# Patient Record
Sex: FEMALE | Race: BLACK OR AFRICAN AMERICAN | NOT HISPANIC OR LATINO | Employment: OTHER | URBAN - METROPOLITAN AREA
[De-identification: names, ages, dates, MRNs, and addresses within clinical notes are randomized per-mention and may not be internally consistent; named-entity substitution may affect disease eponyms.]

---

## 2017-01-05 NOTE — TELEPHONE ENCOUNTER
----- Message from Carol Welch sent at 1/5/2017  1:10 PM CST -----  Contact: pt   Pt needs a refill on her blood pressure medication, pt could pronounce or [place], [facility] [address], please call pt when done

## 2017-01-16 RX ORDER — DILTIAZEM HYDROCHLORIDE 240 MG/1
240 CAPSULE, COATED, EXTENDED RELEASE ORAL DAILY
Qty: 90 CAPSULE | Refills: 1 | Status: SHIPPED | OUTPATIENT
Start: 2017-01-16 | End: 2017-06-07 | Stop reason: ALTCHOICE

## 2017-03-06 ENCOUNTER — PATIENT OUTREACH (OUTPATIENT)
Dept: ADMINISTRATIVE | Facility: HOSPITAL | Age: 62
End: 2017-03-06

## 2017-03-06 ENCOUNTER — TELEPHONE (OUTPATIENT)
Dept: FAMILY MEDICINE | Facility: CLINIC | Age: 62
End: 2017-03-06

## 2017-03-06 DIAGNOSIS — M19.90 ARTHRITIS: Primary | ICD-10-CM

## 2017-03-06 NOTE — TELEPHONE ENCOUNTER
Pt is wanting to get a new order for a rollator walker (walker with seat) please print order to send to [facility]

## 2017-03-06 NOTE — TELEPHONE ENCOUNTER
----- Message from Chalo Lam sent at 3/6/2017  1:44 PM CST -----  Contact: [phone]  Pt would like an order for a new Walker, or need to know where can she go to get repairs on her walker.  Please call back @ [phone].  Thanks-[facility]

## 2017-03-07 NOTE — TELEPHONE ENCOUNTER
----- Message from Ana Gilman sent at 3/7/2017 10:32 AM CST -----  Contact: Pt  Pt is requesting to speak with the nurse./ States her walker is broken and she needs to know what company you all use so that she can get it fixed./ Please advise [phone] (home)

## 2017-03-21 ENCOUNTER — HOSPITAL ENCOUNTER (EMERGENCY)
Facility: HOSPITAL | Age: 62
Discharge: HOME | End: 2017-03-21
Attending: EMERGENCY MEDICINE
Payer: COMMERCIAL

## 2017-03-21 VITALS
WEIGHT: 255 LBS | HEIGHT: 65 IN | BODY MASS INDEX: 42.49 KG/M2 | HEART RATE: 70 BPM | DIASTOLIC BLOOD PRESSURE: 87 MMHG | SYSTOLIC BLOOD PRESSURE: 171 MMHG | TEMPERATURE: 98 F | OXYGEN SATURATION: 100 % | RESPIRATION RATE: 18 BRPM

## 2017-03-21 DIAGNOSIS — I50.33 ACUTE ON CHRONIC DIASTOLIC CONGESTIVE HEART FAILURE: Primary | ICD-10-CM

## 2017-03-21 DIAGNOSIS — I10 ESSENTIAL HYPERTENSION: ICD-10-CM

## 2017-03-21 DIAGNOSIS — Z72.0 TOBACCO ABUSE: ICD-10-CM

## 2017-03-21 DIAGNOSIS — N18.30 CKD (CHRONIC KIDNEY DISEASE) STAGE 3, GFR 30-59 ML/MIN: ICD-10-CM

## 2017-03-21 DIAGNOSIS — E66.01 MORBID OBESITY WITH BMI OF 40.0-44.9, ADULT: ICD-10-CM

## 2017-03-21 LAB
ALBUMIN SERPL BCP-MCNC: 3.1 G/DL
ALP SERPL-CCNC: 122 U/L
ALT SERPL W/O P-5'-P-CCNC: 17 U/L
ANION GAP SERPL CALC-SCNC: 10 MMOL/L
APTT BLDCRRT: 30.1 SEC
AST SERPL-CCNC: 17 U/L
BASOPHILS # BLD AUTO: 0.01 K/UL
BASOPHILS NFR BLD: 0.2 %
BILIRUB SERPL-MCNC: 0.5 MG/DL
BNP SERPL-MCNC: 527 PG/ML
BUN SERPL-MCNC: 30 MG/DL
CALCIUM SERPL-MCNC: 10.7 MG/DL
CHLORIDE SERPL-SCNC: 111 MMOL/L
CO2 SERPL-SCNC: 22 MMOL/L
CREAT SERPL-MCNC: 2.2 MG/DL
DIFFERENTIAL METHOD: ABNORMAL
EOSINOPHIL # BLD AUTO: 0.1 K/UL
EOSINOPHIL NFR BLD: 1 %
ERYTHROCYTE [DISTWIDTH] IN BLOOD BY AUTOMATED COUNT: 17.6 %
EST. GFR  (AFRICAN AMERICAN): 27 ML/MIN/1.73 M^2
EST. GFR  (NON AFRICAN AMERICAN): 23 ML/MIN/1.73 M^2
GLUCOSE SERPL-MCNC: 96 MG/DL
HCT VFR BLD AUTO: 30.2 %
HGB BLD-MCNC: 9.6 G/DL
INR PPP: 1
LYMPHOCYTES # BLD AUTO: 0.8 K/UL
LYMPHOCYTES NFR BLD: 15 %
MCH RBC QN AUTO: 27.3 PG
MCHC RBC AUTO-ENTMCNC: 31.8 %
MCV RBC AUTO: 86 FL
MONOCYTES # BLD AUTO: 0.5 K/UL
MONOCYTES NFR BLD: 8.6 %
NEUTROPHILS # BLD AUTO: 4 K/UL
NEUTROPHILS NFR BLD: 75.2 %
PLATELET # BLD AUTO: 219 K/UL
PMV BLD AUTO: 9 FL
POTASSIUM SERPL-SCNC: 3.6 MMOL/L
PROT SERPL-MCNC: 7.4 G/DL
PROTHROMBIN TIME: 10.8 SEC
RBC # BLD AUTO: 3.52 M/UL
SODIUM SERPL-SCNC: 143 MMOL/L
TROPONIN I SERPL DL<=0.01 NG/ML-MCNC: 0.2 NG/ML
WBC # BLD AUTO: 5.25 K/UL

## 2017-03-21 PROCEDURE — 83880 ASSAY OF NATRIURETIC PEPTIDE: CPT

## 2017-03-21 PROCEDURE — 96375 TX/PRO/DX INJ NEW DRUG ADDON: CPT

## 2017-03-21 PROCEDURE — 99284 EMERGENCY DEPT VISIT MOD MDM: CPT | Mod: 25

## 2017-03-21 PROCEDURE — 85610 PROTHROMBIN TIME: CPT

## 2017-03-21 PROCEDURE — 93005 ELECTROCARDIOGRAM TRACING: CPT

## 2017-03-21 PROCEDURE — 80053 COMPREHEN METABOLIC PANEL: CPT

## 2017-03-21 PROCEDURE — 84484 ASSAY OF TROPONIN QUANT: CPT

## 2017-03-21 PROCEDURE — 96374 THER/PROPH/DIAG INJ IV PUSH: CPT

## 2017-03-21 PROCEDURE — 25000003 PHARM REV CODE 250: Performed by: EMERGENCY MEDICINE

## 2017-03-21 PROCEDURE — 85025 COMPLETE CBC W/AUTO DIFF WBC: CPT

## 2017-03-21 PROCEDURE — 85730 THROMBOPLASTIN TIME PARTIAL: CPT

## 2017-03-21 PROCEDURE — 93010 ELECTROCARDIOGRAM REPORT: CPT | Mod: ,,, | Performed by: INTERNAL MEDICINE

## 2017-03-21 PROCEDURE — 63600175 PHARM REV CODE 636 W HCPCS: Performed by: EMERGENCY MEDICINE

## 2017-03-21 RX ORDER — FUROSEMIDE 10 MG/ML
60 INJECTION INTRAMUSCULAR; INTRAVENOUS
Status: COMPLETED | OUTPATIENT
Start: 2017-03-21 | End: 2017-03-21

## 2017-03-21 RX ORDER — ASPIRIN 325 MG
325 TABLET ORAL
Status: COMPLETED | OUTPATIENT
Start: 2017-03-21 | End: 2017-03-21

## 2017-03-21 RX ORDER — HYDRALAZINE HYDROCHLORIDE 20 MG/ML
10 INJECTION INTRAMUSCULAR; INTRAVENOUS
Status: COMPLETED | OUTPATIENT
Start: 2017-03-21 | End: 2017-03-21

## 2017-03-21 RX ADMIN — FUROSEMIDE 60 MG: 10 INJECTION, SOLUTION INTRAMUSCULAR; INTRAVENOUS at 11:03

## 2017-03-21 RX ADMIN — HYDRALAZINE HYDROCHLORIDE 10 MG: 20 INJECTION INTRAMUSCULAR; INTRAVENOUS at 11:03

## 2017-03-21 RX ADMIN — ASPIRIN 325 MG ORAL TABLET 325 MG: 325 PILL ORAL at 11:03

## 2017-03-21 NOTE — ED PROVIDER NOTES
SCRIBE #1 NOTE: I, Mark Emanuel, am scribing for, and in the presence of, Roberta Alvarado MD. I have scribed the entire note.      History      Chief Complaint   Patient presents with    Shortness of Breath     worsening SOB for few days, hx of CHF       Review of patient's allergies indicates:  No Known Allergies     HPI   HPI    3/21/2017, 10:54 AM   History obtained from the patient and daughter      History of Present Illness: Kelsey Booker is a 61 y.o. female patient with a PMHx of CHF, HTN, who presents to the Emergency Department for SOB which onset gradually 2 days ago. Sxs are constant and moderate in severity. Sxs exacerbated when laying flat. There are no other mitigating or exacerbating factors noted. Associated sxs include dry cough.  Pt denies any fever, N/V/D, CP, chest tightness or pressure, calf pain, numbness, HA, LOC, and all other sxs at this time. Prior tx includes LASIX BID as Rx. No further complaints or concerns at this time.       Arrival mode: Personal vehicle      PCP: Silvana Stover MD       Past Medical History:  Past Medical History:   Diagnosis Date    Anemia     CHF (congestive heart failure)     Follows with Dr. Levar Reyes, cardiologist    Chronic back pain     Dr. Jonathon Tristan, pain management specialist, at [facility].    CKD (chronic kidney disease)     Follows with Dr. Mccullough, nephrologist    GERD (gastroesophageal reflux disease)     High cholesterol     History of abnormal cervical Pap smear     HTN (hypertension)     Postmenopausal     Tobacco use        Past Surgical History:  Past Surgical History:   Procedure Laterality Date    BACK SURGERY      DILATION AND CURETTAGE OF UTERUS      partial hysterectomy      Due to Fibroids         Family History:  Family History   Problem Relation Age of Onset    Thrombosis Mother     Heart disease Father     Heart disease Sister     Hypertension Brother     Heart disease Brother      Diabetes Cousin     Stroke Neg Hx     Cancer Neg Hx        Social History:  Social History     Social History Main Topics    Smoking status: Current Every Day Smoker     Packs/day: 0.50     Types: Cigarettes     Start date: 1/1/1985    Smokeless tobacco: Never Used      Comment: Interested in smoking cessation program.    Alcohol use No    Drug use: No    Sexual activity: No       ROS   Review of Systems   Constitutional: Negative for fever.   HENT: Negative for sore throat.    Respiratory: Positive for cough and shortness of breath.    Cardiovascular: Negative for chest pain, palpitations and leg swelling.   Gastrointestinal: Negative for constipation, diarrhea, nausea and vomiting.   Genitourinary: Negative for dysuria.   Musculoskeletal: Negative for back pain.   Skin: Negative for rash.   Neurological: Negative for dizziness, syncope, weakness, numbness and headaches.   Hematological: Does not bruise/bleed easily.       Physical Exam    Initial Vitals   BP Pulse Resp Temp SpO2   03/21/17 1029 03/21/17 1029 03/21/17 1029 03/21/17 1029 03/21/17 1029   182/105 74 16 98 °F (36.7 °C) 92 %      Physical Exam  Nursing Notes and Vital Signs Reviewed.  Constitutional: Patient is in no acute distress. Awake and alert. Well-developed and well-nourished. Morbidly obese.  Head: Atraumatic. Normocephalic.  Eyes: PERRL. EOM intact. Conjunctivae are not pale. No scleral icterus.  ENT: Mucous membranes are moist. Oropharynx is clear and symmetric.    Neck: Supple. Full ROM. No lymphadenopathy.  Cardiovascular: Regular rate. Regular rhythm. No murmurs, rubs, or gallops. Distal pulses are 2+ and symmetric.  Pulmonary/Chest: No respiratory distress. Clear to auscultation bilaterally. No wheezing, rales, or rhonchi.  Abdominal: Soft and non-distended.  There is no tenderness.  No rebound, guarding, or rigidity. Good bowel sounds.  Genitourinary: No CVA tenderness  Musculoskeletal: Moves all extremities. No obvious  "deformities. No edema. No calf tenderness.  Skin: Warm and dry.  Neurological:  Alert, awake, and appropriate.  Normal speech.  No acute focal neurological deficits are appreciated.  Psychiatric: Normal affect. Good eye contact. Appropriate in content.    ED Course    Procedures  ED Vital Signs:  Vitals:    03/21/17 1029 03/21/17 1054 03/21/17 1130 03/21/17 1217   BP: (!) 182/105 (!) 174/99 (!) 177/97 (!) 175/95   Pulse: 74 69  68   Resp: 16 17  16   Temp: 98 °F (36.7 °C)      TempSrc: Oral      SpO2: (!) 92% 98% 96% 100%   Weight: 115.7 kg (255 lb)      Height: 5' 5" (1.651 m)       03/21/17 1314   BP: (!) 171/87   Pulse: 70   Resp: 18   Temp: 98 °F (36.7 °C)   TempSrc: Oral   SpO2: 100%   Weight:    Height:        Abnormal Lab Results:  Labs Reviewed   CBC W/ AUTO DIFFERENTIAL - Abnormal; Notable for the following:        Result Value    RBC 3.52 (*)     Hemoglobin 9.6 (*)     Hematocrit 30.2 (*)     MCHC 31.8 (*)     RDW 17.6 (*)     MPV 9.0 (*)     Lymph # 0.8 (*)     Gran% 75.2 (*)     Lymph% 15.0 (*)     All other components within normal limits   COMPREHENSIVE METABOLIC PANEL - Abnormal; Notable for the following:     Chloride 111 (*)     CO2 22 (*)     BUN, Bld 30 (*)     Creatinine 2.2 (*)     Calcium 10.7 (*)     Albumin 3.1 (*)     eGFR if  27 (*)     eGFR if non  23 (*)     All other components within normal limits   B-TYPE NATRIURETIC PEPTIDE - Abnormal; Notable for the following:      (*)     All other components within normal limits   TROPONIN I - Abnormal; Notable for the following:     Troponin I 0.205 (*)     All other components within normal limits   PROTIME-INR   APTT        All Lab Results:  Results for orders placed or performed during the hospital encounter of 03/21/17   CBC auto differential   Result Value Ref Range    WBC 5.25 3.90 - 12.70 K/uL    RBC 3.52 (L) 4.00 - 5.40 M/uL    Hemoglobin 9.6 (L) 12.0 - 16.0 g/dL    Hematocrit 30.2 (L) 37.0 - 48.5 % "    MCV 86 82 - 98 fL    MCH 27.3 27.0 - 31.0 pg    MCHC 31.8 (L) 32.0 - 36.0 %    RDW 17.6 (H) 11.5 - 14.5 %    Platelets 219 150 - 350 K/uL    MPV 9.0 (L) 9.2 - 12.9 fL    Gran # 4.0 1.8 - 7.7 K/uL    Lymph # 0.8 (L) 1.0 - 4.8 K/uL    Mono # 0.5 0.3 - 1.0 K/uL    Eos # 0.1 0.0 - 0.5 K/uL    Baso # 0.01 0.00 - 0.20 K/uL    Gran% 75.2 (H) 38.0 - 73.0 %    Lymph% 15.0 (L) 18.0 - 48.0 %    Mono% 8.6 4.0 - 15.0 %    Eosinophil% 1.0 0.0 - 8.0 %    Basophil% 0.2 0.0 - 1.9 %    Differential Method Automated    Comprehensive metabolic panel   Result Value Ref Range    Sodium 143 136 - 145 mmol/L    Potassium 3.6 3.5 - 5.1 mmol/L    Chloride 111 (H) 95 - 110 mmol/L    CO2 22 (L) 23 - 29 mmol/L    Glucose 96 70 - 110 mg/dL    BUN, Bld 30 (H) 8 - 23 mg/dL    Creatinine 2.2 (H) 0.5 - 1.4 mg/dL    Calcium 10.7 (H) 8.7 - 10.5 mg/dL    Total Protein 7.4 6.0 - 8.4 g/dL    Albumin 3.1 (L) 3.5 - 5.2 g/dL    Total Bilirubin 0.5 0.1 - 1.0 mg/dL    Alkaline Phosphatase 122 55 - 135 U/L    AST 17 10 - 40 U/L    ALT 17 10 - 44 U/L    Anion Gap 10 8 - 16 mmol/L    eGFR if African American 27 (A) >60 mL/min/1.73 m^2    eGFR if non African American 23 (A) >60 mL/min/1.73 m^2   Protime-INR   Result Value Ref Range    Prothrombin Time 10.8 9.0 - 12.5 sec    INR 1.0 0.8 - 1.2   B-Type natriuretic peptide (BNP)   Result Value Ref Range     (H) 0 - 99 pg/mL   APTT   Result Value Ref Range    aPTT 30.1 21.0 - 32.0 sec   Troponin I   Result Value Ref Range    Troponin I 0.205 (H) 0.000 - 0.026 ng/mL         Imaging Results:  Imaging Results         X-Ray Chest AP Portable (Final result) Result time:  03/21/17 11:11:47    Final result by ROSIE Howell Sr., MD (03/21/17 11:11:47)    Impression:        1.  The lungs are clear.  2.  There is mild cardiomegaly.       Electronically signed by: ROSIE HOWELL MD  Date:     03/21/17  Time:    11:11     Narrative:    One-view chest x-ray    Clinical History: Chest pain    Finding: Comparison was made  to prior examination performed on 12/10/2016.  There is mild cardiomegaly.  The lungs are clear. There is no pneumothorax.  The costophrenic angles are sharp.                The EKG was ordered, reviewed, and independently interpreted by the ED provider.  Interpretation time: 10:43  Rate: 67 BPM  Rhythm: sinus arrhythmia  Interpretation: Criteria for LVH. T wave abnormality. No STEMI.  When compared to EKG performed 12/10/16, there are no significant changes.    The Emergency Provider reviewed the vital signs and test results, which are outlined above.    ED Discussion     1:04 PM: Reassessed pt. Pt states their condition has improved at this time, she has diuresed, VSS, has chronic poorly controlled Hypertension, troponin is chronically elevated and at baseline has CKD.  Discussed with pt all pertinent ED information and results. Discussed plan of treatment with pt. Pt instructed to f/u with cardiologist Dr. García in 1-2 days, advised to continue current lasix, low salt diet. Pt reports her BP is typically 170/90s.  Gave pt all f/u and return to the ED instructions. All questions and concerns were addressed at this time. Pt understands and agrees to plan as discussed. Pt is stable for discharge.     Pre-hypertension/Hypertension: The pt has been informed that they may have pre-hypertension or hypertension based on a blood pressure reading in the ED. I recommend that the pt call the PCP listed on their discharge instructions or a physician of their choice this week to arrange f/u for further evaluation of possible pre-hypertension or hypertension.     ED Medication(s):  Medications   aspirin tablet 325 mg (325 mg Oral Given 3/21/17 1101)   furosemide injection 60 mg (60 mg Intravenous Given 3/21/17 1102)   hydrALAZINE injection 10 mg (10 mg Intravenous Given 3/21/17 1101)       Discharge Medication List as of 3/21/2017  1:06 PM          Follow-up Information     Follow up with Dr. García your Cardiologist. Schedule  an appointment as soon as possible for a visit in 1 day.    Why:  Return to the emergency room, If symptoms worsen            Medical Decision Making    Medical Decision Making:   Clinical Tests:   Lab Tests: Reviewed and Ordered  Radiological Study: Reviewed and Ordered  Medical Tests: Reviewed and Ordered           Scribe Attestation:   Scribe #1: I performed the above scribed service and the documentation accurately describes the services I performed. I attest to the accuracy of the note.    Attending:   Physician Attestation Statement for Scribe #1: I, Roberta Alvarado MD, personally performed the services described in this documentation, as scribed by Mark Emanuel, in my presence, and it is both accurate and complete.          Clinical Impression       ICD-10-CM ICD-9-CM   1. Acute on chronic diastolic congestive heart failure I50.33 428.33     428.0   2. Essential hypertension I10 401.9   3. Morbid obesity with BMI of 40.0-44.9, adult E66.01 278.01    Z68.41 V85.41   4. Tobacco abuse Z72.0 305.1   5. CKD (chronic kidney disease) stage 3, GFR 30-59 ml/min N18.3 585.3       Disposition:   Disposition: Discharged  Condition: Stable         Roberta Alvarado MD  03/22/17 0848       Roberta Alvarado MD  03/22/17 0848       Roberta Alvarado MD  03/22/17 0849

## 2017-03-21 NOTE — ED AVS SNAPSHOT
[facility] -   [address]  [place] [address]               Kelsey Booker   3/21/2017 10:30 AM   ED    Description:  Female : 1955   Department:  [facility]           Your Care was Coordinated By:     Provider Role From To    Roberta Alvarado MD Attending Provider 17 1033 --      Reason for Visit     Shortness of Breath           Diagnoses this Visit        Comments    Acute on chronic diastolic congestive heart failure    -  Primary     Essential hypertension         Morbid obesity with BMI of 40.0-44.9, adult         Tobacco abuse           ED Disposition     None           To Do List           Follow-up Information     Follow up with Dr. García your Cardiologist. Schedule an appointment as soon as possible for a visit in 1 day.    Why:  Return to the emergency room, If symptoms worsen      [facility]     [facility] -  Assistance  Registered nurses in the [facility] provide clinical advisement, health education, appointment booking, and other advisory services.  Call for this free service at [phone].             Medications           Message regarding Medications     Verify the changes and/or additions to your medication regime listed below are the same as discussed with your clinician today.  If any of these changes or additions are incorrect, please notify your healthcare provider.        These medications were administered today        Dose Freq    aspirin tablet 325 mg 325 mg ED 1 Time    Sig: Take 1 tablet (325 mg total) by mouth ED 1 Time.    Class: Normal    Route: Oral    furosemide injection 60 mg 60 mg ED 1 Time    Sig: Inject 6 mLs (60 mg total) into the vein ED 1 Time.    Class: Normal    Route: Intravenous    hydrALAZINE injection 10 mg 10 mg ED 1 Time    Sig: Inject 0.5 mLs (10 mg total) into the vein ED 1 Time.    Class: Normal    Route: Intravenous      STOP  taking these medications     potassium chloride SA (K-DUR,KLOR-CON) 20 MEQ tablet Take 1 tablet (20 mEq total) by mouth 2 (two) times daily.    nicotine (NICODERM CQ) 21 mg/24 hr Place 1 patch onto the skin once daily.           Verify that the below list of medications is an accurate representation of the medications you are currently taking.  If none reported, the list may be blank. If incorrect, please contact your healthcare provider. Carry this list with you in case of emergency.           Current Medications     diltiaZEM (CARDIZEM CD) 240 MG 24 hr capsule Take 1 capsule (240 mg total) by mouth once daily.    esomeprazole (NEXIUM) 40 MG capsule Take 1 capsule by mouth once daily.    furosemide (LASIX) 40 MG tablet Take 1 tablet (40 mg total) by mouth 2 (two) times daily.    gabapentin (NEURONTIN) 300 MG capsule Take 300 mg by mouth 2 (two) times daily. 2 tablets in the AM and 3 tablets at night    hydrALAZINE (APRESOLINE) 50 MG tablet Take 1 tablet (50 mg total) by mouth 3 (three) times daily.    methocarbamol (ROBAXIN) 750 MG Tab Take 1 tablet by mouth 3 (three) times daily as needed.    metoprolol tartrate (LOPRESSOR) 50 MG tablet Take 1 tablet (50 mg total) by mouth 2 (two) times daily.    oxycodone 20 mg Tab 20 mg every 6 (six) hours as needed.     sertraline (ZOLOFT) 50 MG tablet Take 1 tablet (50 mg total) by mouth once daily.    terazosin (HYTRIN) 2 MG capsule take 2 capsule by mouth at bedtime    benzonatate (TESSALON) 100 MG capsule take 1 capsule by mouth twice a day if needed for cough    CRESTOR 20 mg tablet Take 1 tablet (20 mg total) by mouth every evening.    ferrous sulfate 325 mg (65 mg iron) Tab tablet Take 1 tablet by mouth 2 (two) times daily.    hydrALAZINE injection 10 mg Inject 0.5 mLs (10 mg total) into the vein ED 1 Time.    hyoscyamine (LEVSIN/SL) 0.125 mg Subl Place 1 tablet (0.125 mg total) under the tongue every 4 (four) hours as needed.    promethazine (PHENERGAN) 12.5 MG Tab Take  "12.5 mg by mouth every 8 (eight) hours.           Clinical Reference Information           Your Vitals Were     BP Pulse Temp Resp Height Weight    177/97 69 98 °F (36.7 °C) (Oral) 17 5' 5" (1.651 m) 115.7 kg (255 lb)    SpO2 BMI             96% 42.43 kg/m2         Allergies as of 3/21/2017     No Known Allergies      Immunizations Administered on Date of Encounter - 3/21/2017     None      ED Micro, Lab, POCT     Start Ordered       Status Ordering Provider    03/21/17 1241 03/21/17 1240    Add-on,   Status:  Canceled      Canceled     03/21/17 1036 03/21/17 1035  APTT  STAT      Final result     03/21/17 1035 03/21/17 1035  CBC auto differential  STAT      Final result     03/21/17 1035 03/21/17 1035  Comprehensive metabolic panel  STAT      Final result     03/21/17 1035 03/21/17 1035  Protime-INR  STAT      Final result     03/21/17 1035 03/21/17 1035  B-Type natriuretic peptide (BNP)  STAT      Final result       ED Imaging Orders     Start Ordered       Status Ordering Provider    03/21/17 1036 03/21/17 1035  X-Ray Chest AP Portable  1 time imaging      Final result       Discharge References/Attachments     HEART FAILURE, CONGESTIVE (ENGLISH)    HEART FAILURE, DISCHARGE INSTRUCTIONS FOR (ENGLISH)    HIGH BLOOD PRESSURE (HYPERTENSION), DISCHARGE INSTRUCTIONS (ENGLISH)    HIGH BLOOD PRESSURE, CONTROLLING (ENGLISH)      [facility] Sign-Up     Activating your [facility] account is as easy as 1-2-3!     1) Visit [email or web address], select Sign Up Now, enter this activation code and your date of birth, then select Next.  [email or web address]  Expires: 5/5/2017  1:06 PM      2) Create a username and password to use when you visit [facility] in the future and select a security question in case you lose your password and select Next.    3) Enter your e-mail address and click Sign Up!    Additional Information  If you have questions, please e-mail [email or web address] or call [phone] to talk to our [facility] staff. " Remember, [facility] is NOT to be used for urgent needs. For medical emergencies, dial 911.         Smoking Cessation     If you would like to quit smoking:   You may be eligible for free services if you are a [place] resident and started smoking cigarettes before September 1, 1988.  Call the [employer]oking [employer]essation Trust ([employer]) toll free at (648) 533-0003 or (879) 729-3362.   Call 1-800-QUIT-NOW if you do not meet the above criteria.             [facility] complies with applicable Federal civil rights laws and does not discriminate on the basis of race, color, national origin, age, disability, or sex.        Language Assistance Services     ATTENTION: Language assistance services are available, free of charge. Please call [phone].      ATENCIÓN: Si christosla acrinehalley, tiene a abarca disposición servicios gratuitos de asistencia lingüística. Llame al [phone].     CHÚ Ý: N?u b?n nói Ti?ng Vi?t, có các d?ch v? h? tr? ngôn ng? mi?n phí dành cho b?n. G?i s? [phone].

## 2017-04-11 ENCOUNTER — TELEPHONE (OUTPATIENT)
Dept: SMOKING CESSATION | Facility: CLINIC | Age: 62
End: 2017-04-11

## 2017-04-18 ENCOUNTER — TELEPHONE (OUTPATIENT)
Dept: SMOKING CESSATION | Facility: CLINIC | Age: 62
End: 2017-04-18

## 2017-04-19 ENCOUNTER — TELEPHONE (OUTPATIENT)
Dept: SMOKING CESSATION | Facility: CLINIC | Age: 62
End: 2017-04-19

## 2017-04-19 NOTE — TELEPHONE ENCOUNTER
Third attempt left message regarding smoking cessation quit 1 episode, will call back at a later time.

## 2017-05-17 ENCOUNTER — CLINICAL SUPPORT (OUTPATIENT)
Dept: SMOKING CESSATION | Facility: CLINIC | Age: 62
End: 2017-05-17
Payer: COMMERCIAL

## 2017-05-17 DIAGNOSIS — F17.200 NICOTINE DEPENDENCE: Primary | ICD-10-CM

## 2017-05-17 PROCEDURE — 99407 BEHAV CHNG SMOKING > 10 MIN: CPT | Mod: S$GLB,,,

## 2017-06-07 ENCOUNTER — OFFICE VISIT (OUTPATIENT)
Dept: FAMILY MEDICINE | Facility: CLINIC | Age: 62
End: 2017-06-07
Payer: COMMERCIAL

## 2017-06-07 VITALS
BODY MASS INDEX: 42.39 KG/M2 | OXYGEN SATURATION: 90 % | TEMPERATURE: 97 F | DIASTOLIC BLOOD PRESSURE: 98 MMHG | HEART RATE: 86 BPM | WEIGHT: 254.44 LBS | HEIGHT: 65 IN | SYSTOLIC BLOOD PRESSURE: 146 MMHG | RESPIRATION RATE: 18 BRPM

## 2017-06-07 DIAGNOSIS — Z76.0 MEDICATION REFILL: ICD-10-CM

## 2017-06-07 DIAGNOSIS — R51.9 HEADACHE, UNSPECIFIED HEADACHE TYPE: ICD-10-CM

## 2017-06-07 DIAGNOSIS — I10 ESSENTIAL HYPERTENSION: Primary | ICD-10-CM

## 2017-06-07 PROCEDURE — 99214 OFFICE O/P EST MOD 30 MIN: CPT | Mod: S$GLB,,, | Performed by: FAMILY MEDICINE

## 2017-06-07 PROCEDURE — 99999 PR PBB SHADOW E&M-EST. PATIENT-LVL IV: CPT | Mod: PBBFAC,,, | Performed by: FAMILY MEDICINE

## 2017-06-07 RX ORDER — BUTALBITAL, ACETAMINOPHEN AND CAFFEINE 50; 325; 40 MG/1; MG/1; MG/1
1 TABLET ORAL DAILY PRN
Qty: 10 TABLET | Refills: 0 | Status: SHIPPED | OUTPATIENT
Start: 2017-06-07 | End: 2017-07-27 | Stop reason: SDUPTHER

## 2017-06-07 RX ORDER — METOPROLOL TARTRATE 50 MG/1
50 TABLET ORAL 2 TIMES DAILY
Qty: 60 TABLET | Refills: 1 | Status: SHIPPED | OUTPATIENT
Start: 2017-06-07

## 2017-06-07 RX ORDER — HYOSCYAMINE SULFATE 0.12 MG/1
0.12 TABLET SUBLINGUAL EVERY 4 HOURS PRN
Qty: 30 TABLET | Refills: 0 | Status: SHIPPED | OUTPATIENT
Start: 2017-06-07

## 2017-06-07 RX ORDER — LOSARTAN POTASSIUM 25 MG/1
TABLET ORAL
Refills: 0 | COMMUNITY
Start: 2017-04-19 | End: 2017-06-07 | Stop reason: SDUPTHER

## 2017-06-07 RX ORDER — DILTIAZEM HYDROCHLORIDE 240 MG/1
CAPSULE, COATED, EXTENDED RELEASE ORAL
Status: CANCELLED | OUTPATIENT
Start: 2017-06-07

## 2017-06-07 RX ORDER — HYDRALAZINE HYDROCHLORIDE 100 MG/1
TABLET, FILM COATED ORAL
Refills: 1 | COMMUNITY
Start: 2017-04-19 | End: 2017-06-07 | Stop reason: SDUPTHER

## 2017-06-07 RX ORDER — TERAZOSIN 2 MG/1
CAPSULE ORAL
Qty: 60 CAPSULE | Refills: 1 | Status: SHIPPED | OUTPATIENT
Start: 2017-06-07

## 2017-06-07 RX ORDER — DILTIAZEM HYDROCHLORIDE 360 MG/1
360 CAPSULE, EXTENDED RELEASE ORAL DAILY
Qty: 30 CAPSULE | Refills: 1 | Status: SHIPPED | OUTPATIENT
Start: 2017-06-07 | End: 2017-08-24 | Stop reason: SDUPTHER

## 2017-06-07 RX ORDER — HYDRALAZINE HYDROCHLORIDE 100 MG/1
100 TABLET, FILM COATED ORAL 3 TIMES DAILY
Qty: 90 TABLET | Refills: 1 | Status: SHIPPED | OUTPATIENT
Start: 2017-06-07

## 2017-06-07 RX ORDER — LOSARTAN POTASSIUM 25 MG/1
25 TABLET ORAL DAILY
Qty: 30 TABLET | Refills: 1 | Status: SHIPPED | OUTPATIENT
Start: 2017-06-07

## 2017-06-07 RX ORDER — FUROSEMIDE 40 MG/1
40 TABLET ORAL 2 TIMES DAILY
Qty: 60 TABLET | Refills: 1 | Status: SHIPPED | OUTPATIENT
Start: 2017-06-07

## 2017-06-07 NOTE — PROGRESS NOTES
Subjective:       Patient ID: Kelsey Booker is a 61 y.o. female.    Chief Complaint: Follow-up and Headache      HPI   Ms. Booker presents to clinic today for medication refill.  She also states she has been having headaches.   The headache are located over her frontal lobe.   The headache are associated with some photophobia, some nausea.   She states at times they are associated with aura.   The headache are not better with food.   They are sometimes better with sitting in a dark room.   They are not associated with any vomiting.   The headache have been going on for 1 week.   She also has been out of her terazosin for 1 week.   She has not got her eyes checked in some time and she states she need to do that.       Review of Systems   Constitutional: Negative for fever.   Eyes: Positive for photophobia and visual disturbance.   Respiratory: Negative for cough and shortness of breath.    Cardiovascular: Negative for chest pain.   Gastrointestinal: Positive for nausea. Negative for abdominal pain, diarrhea and vomiting.   Neurological: Positive for headaches.       Medication List with Changes/Refills   New Medications    BUTALBITAL-ACETAMINOPHEN-CAFFEINE -40 MG (FIORICET, ESGIC) -40 MG PER TABLET    Take 1 tablet by mouth daily as needed for Pain.    DILTIAZEM (TIAZAC) 360 MG CPSR    Take 1 capsule (360 mg total) by mouth once daily.   Current Medications    BENZONATATE (TESSALON) 100 MG CAPSULE    take 1 capsule by mouth twice a day if needed for cough    CRESTOR 20 MG TABLET    Take 1 tablet (20 mg total) by mouth every evening.    ESOMEPRAZOLE (NEXIUM) 40 MG CAPSULE    Take 1 capsule by mouth once daily.    FERROUS SULFATE 325 MG (65 MG IRON) TAB TABLET    Take 1 tablet by mouth 2 (two) times daily.    GABAPENTIN (NEURONTIN) 300 MG CAPSULE    Take 300 mg by mouth 2 (two) times daily. 2 tablets in the AM and 3 tablets at night    METHOCARBAMOL (ROBAXIN) 750 MG TAB    Take 1 tablet by mouth  3 (three) times daily as needed.    OXYCODONE 20 MG TAB    20 mg every 6 (six) hours as needed.     PROMETHAZINE (PHENERGAN) 12.5 MG TAB    Take 12.5 mg by mouth every 8 (eight) hours.    SERTRALINE (ZOLOFT) 50 MG TABLET    Take 1 tablet (50 mg total) by mouth once daily.   Changed and/or Refilled Medications    Modified Medication Previous Medication    FUROSEMIDE (LASIX) 40 MG TABLET furosemide (LASIX) 40 MG tablet       Take 1 tablet (40 mg total) by mouth 2 (two) times daily.    Take 1 tablet (40 mg total) by mouth 2 (two) times daily.    HYDRALAZINE (APRESOLINE) 100 MG TABLET hydrALAZINE (APRESOLINE) 100 MG tablet       Take 1 tablet (100 mg total) by mouth 3 (three) times daily.        HYOSCYAMINE (LEVSIN/SL) 0.125 MG SUBL hyoscyamine (LEVSIN/SL) 0.125 mg Subl       Place 1 tablet (0.125 mg total) under the tongue every 4 (four) hours as needed.    Place 1 tablet (0.125 mg total) under the tongue every 4 (four) hours as needed.    LOSARTAN (COZAAR) 25 MG TABLET losartan (COZAAR) 25 MG tablet       Take 1 tablet (25 mg total) by mouth once daily.        METOPROLOL TARTRATE (LOPRESSOR) 50 MG TABLET metoprolol tartrate (LOPRESSOR) 50 MG tablet       Take 1 tablet (50 mg total) by mouth 2 (two) times daily.    Take 1 tablet (50 mg total) by mouth 2 (two) times daily.    TERAZOSIN (HYTRIN) 2 MG CAPSULE terazosin (HYTRIN) 2 MG capsule       take 2 capsule by mouth at bedtime    take 2 capsule by mouth at bedtime   Discontinued Medications    DILTIAZEM (CARDIZEM CD) 240 MG 24 HR CAPSULE    Take 1 capsule (240 mg total) by mouth once daily.    HYDRALAZINE (APRESOLINE) 50 MG TABLET    Take 1 tablet (50 mg total) by mouth 3 (three) times daily.       Patient Active Problem List   Diagnosis    Essential hypertension    Hyperlipidemia    Gastroesophageal reflux disease    Morbid obesity with BMI of 40.0-44.9, adult    Tobacco use    Upper respiratory tract infection    Elevated troponin    ADHF (acute  decompensated heart failure)    Accelerated hypertension    CKD (chronic kidney disease), stage IV         Objective:     Physical Exam   Constitutional: She is oriented to person, place, and time. She appears well-developed and well-nourished. No distress.   HENT:   Head: Normocephalic and atraumatic.   Right Ear: External ear normal.   Left Ear: External ear normal.   Mouth/Throat: Oropharynx is clear and moist.   Eyes: EOM are normal. Right eye exhibits no discharge. Left eye exhibits no discharge.   Cardiovascular: Normal rate and regular rhythm.    Pulmonary/Chest: Effort normal and breath sounds normal. No respiratory distress. She has no wheezes.   Musculoskeletal: She exhibits no edema.   Neurological: She is alert and oriented to person, place, and time.   Skin: Skin is warm and dry. She is not diaphoretic. No erythema.   Psychiatric: She has a normal mood and affect.   Vitals reviewed.    Vitals:    06/07/17 1607   BP: (!) 146/98   Pulse: 86   Resp: 18   Temp: 96.6 °F (35.9 °C)       Assessment/  PLAN     Essential hypertension  -     metoprolol tartrate (LOPRESSOR) 50 MG tablet; Take 1 tablet (50 mg total) by mouth 2 (two) times daily.  Dispense: 60 tablet; Refill: 1  -     hydrALAZINE (APRESOLINE) 100 MG tablet; Take 1 tablet (100 mg total) by mouth 3 (three) times daily.  Dispense: 90 tablet; Refill: 1  -     diltiaZEM (TIAZAC) 360 MG CpSR; Take 1 capsule (360 mg total) by mouth once daily.  Dispense: 30 capsule; Refill: 1  -     losartan (COZAAR) 25 MG tablet; Take 1 tablet (25 mg total) by mouth once daily.  Dispense: 30 tablet; Refill: 1  -     terazosin (HYTRIN) 2 MG capsule; take 2 capsule by mouth at bedtime  Dispense: 60 capsule; Refill: 1    Headache, unspecified headache type  -     butalbital-acetaminophen-caffeine -40 mg (FIORICET, ESGIC) -40 mg per tablet; Take 1 tablet by mouth daily as needed for Pain.  Dispense: 10 tablet; Refill: 0  - advised patient to see eye doctor   -  headache may get better once she get back on her terazosin  - advised patient on reducing stress, getting sleep, and other ways to prevent headache   - take fioricet only if needed    Medication refill  -     furosemide (LASIX) 40 MG tablet; Take 1 tablet (40 mg total) by mouth 2 (two) times daily.  Dispense: 60 tablet; Refill: 1  -     hyoscyamine (LEVSIN/SL) 0.125 mg Subl; Place 1 tablet (0.125 mg total) under the tongue every 4 (four) hours as needed.  Dispense: 30 tablet; Refill: 0    Other orders  -     Cancel: diltiaZEM (CARDIZEM CD) 240 MG 24 hr capsule;     Plan as above   rtc prn     Beau Gao MD  [facility]

## 2017-06-13 ENCOUNTER — HOSPITAL ENCOUNTER (INPATIENT)
Facility: HOSPITAL | Age: 62
LOS: 2 days | Discharge: HOME HEALTH | DRG: 389 | End: 2017-06-15
Attending: EMERGENCY MEDICINE | Admitting: INTERNAL MEDICINE
Payer: COMMERCIAL

## 2017-06-13 ENCOUNTER — OFFICE VISIT (OUTPATIENT)
Dept: FAMILY MEDICINE | Facility: CLINIC | Age: 62
DRG: 389 | End: 2017-06-13
Payer: COMMERCIAL

## 2017-06-13 VITALS
TEMPERATURE: 97 F | HEART RATE: 77 BPM | HEIGHT: 65 IN | SYSTOLIC BLOOD PRESSURE: 92 MMHG | DIASTOLIC BLOOD PRESSURE: 68 MMHG | OXYGEN SATURATION: 91 % | RESPIRATION RATE: 18 BRPM

## 2017-06-13 DIAGNOSIS — R42 DIZZINESS: ICD-10-CM

## 2017-06-13 DIAGNOSIS — N17.9 ACUTE ON CHRONIC RENAL FAILURE: ICD-10-CM

## 2017-06-13 DIAGNOSIS — I95.9 HYPOTENSION, UNSPECIFIED HYPOTENSION TYPE: ICD-10-CM

## 2017-06-13 DIAGNOSIS — I10 ESSENTIAL HYPERTENSION: ICD-10-CM

## 2017-06-13 DIAGNOSIS — K56.600 PARTIAL SMALL BOWEL OBSTRUCTION: Primary | ICD-10-CM

## 2017-06-13 DIAGNOSIS — N18.4 CKD (CHRONIC KIDNEY DISEASE), STAGE IV: ICD-10-CM

## 2017-06-13 DIAGNOSIS — N18.9 ACUTE ON CHRONIC RENAL FAILURE: ICD-10-CM

## 2017-06-13 DIAGNOSIS — Z72.0 TOBACCO USE: ICD-10-CM

## 2017-06-13 DIAGNOSIS — R19.7 VOMITING AND DIARRHEA: Primary | ICD-10-CM

## 2017-06-13 DIAGNOSIS — E78.5 HYPERLIPIDEMIA, UNSPECIFIED HYPERLIPIDEMIA TYPE: ICD-10-CM

## 2017-06-13 DIAGNOSIS — M25.561 BILATERAL KNEE PAIN: ICD-10-CM

## 2017-06-13 DIAGNOSIS — E66.01 MORBID OBESITY WITH BMI OF 40.0-44.9, ADULT: ICD-10-CM

## 2017-06-13 DIAGNOSIS — R11.2 NAUSEA AND VOMITING, INTRACTABILITY OF VOMITING NOT SPECIFIED, UNSPECIFIED VOMITING TYPE: ICD-10-CM

## 2017-06-13 DIAGNOSIS — R10.9 ABDOMINAL PAIN: ICD-10-CM

## 2017-06-13 DIAGNOSIS — R11.10 VOMITING AND DIARRHEA: Primary | ICD-10-CM

## 2017-06-13 DIAGNOSIS — M25.562 BILATERAL KNEE PAIN: ICD-10-CM

## 2017-06-13 LAB
ALBUMIN SERPL BCP-MCNC: 3.1 G/DL
ALP SERPL-CCNC: 117 U/L
ALT SERPL W/O P-5'-P-CCNC: 16 U/L
ANION GAP SERPL CALC-SCNC: 10 MMOL/L
AST SERPL-CCNC: 21 U/L
BASOPHILS # BLD AUTO: 0.01 K/UL
BASOPHILS NFR BLD: 0.1 %
BILIRUB SERPL-MCNC: 0.5 MG/DL
BILIRUB UR QL STRIP: NEGATIVE
BUN SERPL-MCNC: 54 MG/DL
CALCIUM SERPL-MCNC: 10.5 MG/DL
CHLORIDE SERPL-SCNC: 112 MMOL/L
CLARITY UR: CLEAR
CO2 SERPL-SCNC: 17 MMOL/L
COLOR UR: YELLOW
CREAT SERPL-MCNC: 3.2 MG/DL
DIFFERENTIAL METHOD: ABNORMAL
EOSINOPHIL # BLD AUTO: 0 K/UL
EOSINOPHIL NFR BLD: 0 %
ERYTHROCYTE [DISTWIDTH] IN BLOOD BY AUTOMATED COUNT: 17.5 %
EST. GFR  (AFRICAN AMERICAN): 17 ML/MIN/1.73 M^2
EST. GFR  (NON AFRICAN AMERICAN): 15 ML/MIN/1.73 M^2
GLUCOSE SERPL-MCNC: 94 MG/DL
GLUCOSE UR QL STRIP: NEGATIVE
HCT VFR BLD AUTO: 29.2 %
HGB BLD-MCNC: 9.3 G/DL
HGB UR QL STRIP: NEGATIVE
KETONES UR QL STRIP: NEGATIVE
LEUKOCYTE ESTERASE UR QL STRIP: NEGATIVE
LIPASE SERPL-CCNC: 48 U/L
LYMPHOCYTES # BLD AUTO: 0.6 K/UL
LYMPHOCYTES NFR BLD: 6.3 %
MAGNESIUM SERPL-MCNC: 2.4 MG/DL
MCH RBC QN AUTO: 27.4 PG
MCHC RBC AUTO-ENTMCNC: 31.8 %
MCV RBC AUTO: 86 FL
MONOCYTES # BLD AUTO: 0.8 K/UL
MONOCYTES NFR BLD: 8.9 %
NEUTROPHILS # BLD AUTO: 7.5 K/UL
NEUTROPHILS NFR BLD: 84.7 %
NITRITE UR QL STRIP: NEGATIVE
PH UR STRIP: 6 [PH] (ref 5–8)
PHOSPHATE SERPL-MCNC: 2.6 MG/DL
PLATELET # BLD AUTO: 182 K/UL
PMV BLD AUTO: 9.6 FL
POTASSIUM SERPL-SCNC: 3.7 MMOL/L
PROT SERPL-MCNC: 7.5 G/DL
PROT UR QL STRIP: ABNORMAL
RBC # BLD AUTO: 3.4 M/UL
SODIUM SERPL-SCNC: 139 MMOL/L
SP GR UR STRIP: 1.01 (ref 1–1.03)
URN SPEC COLLECT METH UR: ABNORMAL
UROBILINOGEN UR STRIP-ACNC: NEGATIVE EU/DL
WBC # BLD AUTO: 8.84 K/UL

## 2017-06-13 PROCEDURE — 96361 HYDRATE IV INFUSION ADD-ON: CPT

## 2017-06-13 PROCEDURE — 99285 EMERGENCY DEPT VISIT HI MDM: CPT | Mod: 25

## 2017-06-13 PROCEDURE — 99214 OFFICE O/P EST MOD 30 MIN: CPT | Mod: S$GLB,,, | Performed by: FAMILY MEDICINE

## 2017-06-13 PROCEDURE — 25500020 PHARM REV CODE 255: Performed by: EMERGENCY MEDICINE

## 2017-06-13 PROCEDURE — 80053 COMPREHEN METABOLIC PANEL: CPT

## 2017-06-13 PROCEDURE — 96372 THER/PROPH/DIAG INJ SC/IM: CPT

## 2017-06-13 PROCEDURE — 83690 ASSAY OF LIPASE: CPT

## 2017-06-13 PROCEDURE — 93005 ELECTROCARDIOGRAM TRACING: CPT

## 2017-06-13 PROCEDURE — 36415 COLL VENOUS BLD VENIPUNCTURE: CPT

## 2017-06-13 PROCEDURE — 96374 THER/PROPH/DIAG INJ IV PUSH: CPT

## 2017-06-13 PROCEDURE — 81003 URINALYSIS AUTO W/O SCOPE: CPT

## 2017-06-13 PROCEDURE — 96375 TX/PRO/DX INJ NEW DRUG ADDON: CPT

## 2017-06-13 PROCEDURE — 25000003 PHARM REV CODE 250: Performed by: EMERGENCY MEDICINE

## 2017-06-13 PROCEDURE — 99999 PR PBB SHADOW E&M-EST. PATIENT-LVL IV: CPT | Mod: PBBFAC,,, | Performed by: FAMILY MEDICINE

## 2017-06-13 PROCEDURE — 63600175 PHARM REV CODE 636 W HCPCS: Performed by: EMERGENCY MEDICINE

## 2017-06-13 PROCEDURE — 85025 COMPLETE CBC W/AUTO DIFF WBC: CPT

## 2017-06-13 PROCEDURE — 83735 ASSAY OF MAGNESIUM: CPT

## 2017-06-13 PROCEDURE — 11000001 HC ACUTE MED/SURG PRIVATE ROOM

## 2017-06-13 PROCEDURE — 84100 ASSAY OF PHOSPHORUS: CPT

## 2017-06-13 PROCEDURE — 93010 ELECTROCARDIOGRAM REPORT: CPT | Mod: S$GLB,,, | Performed by: INTERNAL MEDICINE

## 2017-06-13 RX ORDER — ONDANSETRON 2 MG/ML
4 INJECTION INTRAMUSCULAR; INTRAVENOUS
Status: ACTIVE | OUTPATIENT
Start: 2017-06-13 | End: 2017-06-14

## 2017-06-13 RX ORDER — ONDANSETRON 2 MG/ML
4 INJECTION INTRAMUSCULAR; INTRAVENOUS
Status: COMPLETED | OUTPATIENT
Start: 2017-06-13 | End: 2017-06-13

## 2017-06-13 RX ORDER — SODIUM CHLORIDE 9 MG/ML
INJECTION, SOLUTION INTRAVENOUS CONTINUOUS
Status: DISCONTINUED | OUTPATIENT
Start: 2017-06-14 | End: 2017-06-14

## 2017-06-13 RX ORDER — OXYCODONE AND ACETAMINOPHEN 10; 325 MG/1; MG/1
1 TABLET ORAL
Status: COMPLETED | OUTPATIENT
Start: 2017-06-13 | End: 2017-06-13

## 2017-06-13 RX ADMIN — SODIUM CHLORIDE 500 ML: 0.9 INJECTION, SOLUTION INTRAVENOUS at 05:06

## 2017-06-13 RX ADMIN — OXYCODONE HYDROCHLORIDE AND ACETAMINOPHEN 1 TABLET: 10; 325 TABLET ORAL at 04:06

## 2017-06-13 RX ADMIN — SODIUM CHLORIDE 500 ML: 0.9 INJECTION, SOLUTION INTRAVENOUS at 04:06

## 2017-06-13 RX ADMIN — IOHEXOL 30 ML: 350 INJECTION, SOLUTION INTRAVENOUS at 05:06

## 2017-06-13 RX ADMIN — ONDANSETRON 4 MG: 2 INJECTION INTRAMUSCULAR; INTRAVENOUS at 04:06

## 2017-06-13 NOTE — ED PROVIDER NOTES
SCRIBE #1 NOTE: I, Dalton Ortiz, am scribing for, and in the presence of, Moises Dutton Jr., MD. I have scribed the HPI, ROS, PEx, and EKG.     SCRIBE #2 NOTE: I, Corinne Mack, am scribing for, and in the presence of, Vanda Alvarado MD.     History      Chief Complaint   Patient presents with    Weakness     sent by PCP  for hypotension, low O2, pt reports recent fall, and having vomiting and diarrhea        Review of patient's allergies indicates:  No Known Allergies     HPI   HPI    6/13/2017, 3:51 PM   History obtained from the patient      History of Present Illness: Kelsey Booker is a 61 y.o. female patient who presents to the Emergency Department for fatigue which onset gradually 2 days ago. Symptoms are constant and moderate in severity. Pt states she has had nausea, vomiting, and diarrhea for the past 2 days. Pt also had a ground level fall onto her knees 2 days ago. Pt states she has been feeling lightheaded more often and that caused her to fall. Pt reports having bilateral knee pain from the fall  No mitigating or exacerbating factors reported. Associated sxs include abdominal cramping. Patient denies any fever, chills, blood in stool, constipation, dysuria, CP, SOB, syncope, LOC, HA, lightheadedness, dizziness, numbness, weakness, and all other sxs at this time. No further complaints or concerns at this time.         Arrival mode: Personal vehicle     PCP: Silvana Stover MD       Past Medical History:  Past Medical History:   Diagnosis Date    Anemia     CHF (congestive heart failure)     Follows with Dr. Levar Reyes, cardiologist    Chronic back pain     Dr. Jonathon Tristan, pain management specialist, at [facility].    CKD (chronic kidney disease)     Follows with Dr. Mccullough, nephrologist    GERD (gastroesophageal reflux disease)     High cholesterol     History of abnormal cervical Pap smear     HTN (hypertension)     Postmenopausal     Tobacco use        Past Surgical  History:  Past Surgical History:   Procedure Laterality Date    BACK SURGERY      DILATION AND CURETTAGE OF UTERUS      partial hysterectomy      Due to Fibroids         Family History:  Family History   Problem Relation Age of Onset    Thrombosis Mother     Heart disease Father     Heart disease Sister     Hypertension Brother     Heart disease Brother     Diabetes Cousin     Stroke Neg Hx     Cancer Neg Hx        Social History:  Social History     Social History Main Topics    Smoking status: Current Every Day Smoker     Packs/day: 1.00     Types: Cigarettes     Start date: 1/1/1985    Smokeless tobacco: Never Used      Comment: Interested in smoking cessation program.    Alcohol use No    Drug use: No    Sexual activity: No       ROS   Review of Systems   Constitutional: Positive for fatigue. Negative for chills and fever.        - LOC   HENT: Negative for sore throat.    Respiratory: Negative for shortness of breath.    Cardiovascular: Negative for chest pain.   Gastrointestinal: Positive for abdominal pain (cramping), diarrhea, nausea and vomiting. Negative for blood in stool and constipation.   Genitourinary: Negative for dysuria and hematuria.   Musculoskeletal: Positive for arthralgias (bilateral knees). Negative for back pain.   Skin: Negative for rash.   Neurological: Negative for dizziness, syncope, weakness, light-headedness, numbness and headaches.   Hematological: Does not bruise/bleed easily.       Physical Exam      Initial Vitals [06/13/17 1518]   BP Pulse Resp Temp SpO2   127/70 64 18 99.1 °F (37.3 °C) (!) 90 %      Physical Exam  Nursing Notes and Vital Signs Reviewed.  Constitutional: Patient is in no acute distress. Well-developed and well-nourished.  Head: Atraumatic. Normocephalic.  Eyes: PERRL. EOM intact. Conjunctivae are not pale. No scleral icterus.  ENT: Mucous membranes are moist. Oropharynx is clear and symmetric.    Neck: Supple. Full ROM. No  lymphadenopathy.  Cardiovascular: Regular rate. Regular rhythm. No murmurs, rubs, or gallops. Distal pulses are 2+ and symmetric.  Pulmonary/Chest: No respiratory distress. Clear to auscultation bilaterally. No wheezing, rales, or rhonchi.  Abdominal: Soft and non-distended.  There is no tenderness.  No rebound, guarding, or rigidity.   Musculoskeletal: Moves all extremities. No obvious deformities. No edema.   Right and Left Knee:  No obvious deformity. There is no swelling.  There is tenderness to the medial aspect of the knees.  No increased warmth, erythema, induration or fluctuance.  No ligament laxity.  DP and PT pulses are 2+.  Normal capillary refill.  Distal sensation is intact.  Skin: Warm and dry.  Neurological:  Alert, awake, and appropriate.  Normal speech.  No acute focal neurological deficits are appreciated. Neurovascularly intact distal to the injury.  Psychiatric: Normal affect. Good eye contact. Appropriate in content.    ED Course    Procedures  ED Vital Signs:  Vitals:    06/13/17 1645 06/13/17 1724 06/13/17 1739 06/13/17 1754   BP:  115/65 122/72 117/72   Pulse:  60 (!) 59 (!) 58   Resp:  (!) 21 18 18   Temp: 98.6 °F (37 °C)      TempSrc: Oral      SpO2:  95% 95% (!) 94%   Weight:       Height:        06/13/17 1809 06/13/17 1817 06/13/17 1820 06/13/17 1854   BP: 125/65   128/71   Pulse: (!) 57 (!) 58 (!) 57 (!) 59   Resp: 17 17  18   Temp:       TempSrc:       SpO2: (!) 93% 95%  (!) 93%   Weight:       Height:        06/13/17 1935 06/13/17 1940 06/13/17 2115 06/13/17 2206   BP:  130/76 (!) 157/88 123/71   Pulse: (!) 56 (!) 57 64 72   Resp: 19 19 20 18   Temp:       TempSrc:       SpO2: (!) 92% 95% 100% 99%   Weight:       Height:        06/13/17 2245 06/13/17 2300 06/14/17 0130   BP: (!) 151/90 (!) 149/85 (!) 142/78   Pulse: 71 72 77   Resp: 18 16 16   Temp:      TempSrc:      SpO2: 99% 100% 100%   Weight:      Height:          Abnormal Lab Results:  Labs Reviewed   CBC W/ AUTO DIFFERENTIAL -  Abnormal; Notable for the following:        Result Value    RBC 3.40 (*)     Hemoglobin 9.3 (*)     Hematocrit 29.2 (*)     MCHC 31.8 (*)     RDW 17.5 (*)     Lymph # 0.6 (*)     Gran% 84.7 (*)     Lymph% 6.3 (*)     All other components within normal limits   COMPREHENSIVE METABOLIC PANEL - Abnormal; Notable for the following:     Chloride 112 (*)     CO2 17 (*)     BUN, Bld 54 (*)     Creatinine 3.2 (*)     Albumin 3.1 (*)     eGFR if  17 (*)     eGFR if non  15 (*)     All other components within normal limits   URINALYSIS - Abnormal; Notable for the following:     Protein, UA Trace (*)     All other components within normal limits   PHOSPHORUS - Abnormal; Notable for the following:     Phosphorus 2.6 (*)     All other components within normal limits   LIPASE   MAGNESIUM   PHOSPHORUS   MAGNESIUM   CBC W/ AUTO DIFFERENTIAL   COMPREHENSIVE METABOLIC PANEL   MAGNESIUM   PHOSPHORUS        All Lab Results:  Results for orders placed or performed during the hospital encounter of 06/13/17   CBC W/ AUTO DIFFERENTIAL   Result Value Ref Range    WBC 8.84 3.90 - 12.70 K/uL    RBC 3.40 (L) 4.00 - 5.40 M/uL    Hemoglobin 9.3 (L) 12.0 - 16.0 g/dL    Hematocrit 29.2 (L) 37.0 - 48.5 %    MCV 86 82 - 98 fL    MCH 27.4 27.0 - 31.0 pg    MCHC 31.8 (L) 32.0 - 36.0 %    RDW 17.5 (H) 11.5 - 14.5 %    Platelets 182 150 - 350 K/uL    MPV 9.6 9.2 - 12.9 fL    Gran # 7.5 1.8 - 7.7 K/uL    Lymph # 0.6 (L) 1.0 - 4.8 K/uL    Mono # 0.8 0.3 - 1.0 K/uL    Eos # 0.0 0.0 - 0.5 K/uL    Baso # 0.01 0.00 - 0.20 K/uL    Gran% 84.7 (H) 38.0 - 73.0 %    Lymph% 6.3 (L) 18.0 - 48.0 %    Mono% 8.9 4.0 - 15.0 %    Eosinophil% 0.0 0.0 - 8.0 %    Basophil% 0.1 0.0 - 1.9 %    Differential Method Automated    Comp. Metabolic Panel   Result Value Ref Range    Sodium 139 136 - 145 mmol/L    Potassium 3.7 3.5 - 5.1 mmol/L    Chloride 112 (H) 95 - 110 mmol/L    CO2 17 (L) 23 - 29 mmol/L    Glucose 94 70 - 110 mg/dL    BUN, Bld 54  (H) 8 - 23 mg/dL    Creatinine 3.2 (H) 0.5 - 1.4 mg/dL    Calcium 10.5 8.7 - 10.5 mg/dL    Total Protein 7.5 6.0 - 8.4 g/dL    Albumin 3.1 (L) 3.5 - 5.2 g/dL    Total Bilirubin 0.5 0.1 - 1.0 mg/dL    Alkaline Phosphatase 117 55 - 135 U/L    AST 21 10 - 40 U/L    ALT 16 10 - 44 U/L    Anion Gap 10 8 - 16 mmol/L    eGFR if African American 17 (A) >60 mL/min/1.73 m^2    eGFR if non African American 15 (A) >60 mL/min/1.73 m^2   Lipase   Result Value Ref Range    Lipase 48 4 - 60 U/L   Urinalysis - Clean Catch   Result Value Ref Range    Specimen UA Urine, Clean Catch     Color, UA Yellow Yellow, Straw, Erika    Appearance, UA Clear Clear    pH, UA 6.0 5.0 - 8.0    Specific Gravity, UA 1.015 1.005 - 1.030    Protein, UA Trace (A) Negative    Glucose, UA Negative Negative    Ketones, UA Negative Negative    Bilirubin (UA) Negative Negative    Occult Blood UA Negative Negative    Nitrite, UA Negative Negative    Urobilinogen, UA Negative <2.0 EU/dL    Leukocytes, UA Negative Negative   Magnesium   Result Value Ref Range    Magnesium 2.4 1.6 - 2.6 mg/dL   Phosphorus   Result Value Ref Range    Phosphorus 2.6 (L) 2.7 - 4.5 mg/dL       Imaging Results:  Imaging Results          CT Abdomen Pelvis  Without Contrast (Final result)  Result time 06/13/17 20:52:20   Procedure changed from CT Abdomen Pelvis With Contrast     Final result by Elias Biggs MD (06/13/17 20:52:20)                 Impression:     Findings concerning for developing or partial small bowel obstruction.  Sigmoid diverticulosis.  Small pericardial effusion.  Hiatal hernia.  3 mm nonobstructing left kidney stone.    All CT scans at this facility use dose modulation, iterative reconstruction, and/or weight based dosing when appropriate to reduce radiation dose to as low as reasonably achievable.      Electronically signed by: ELIAS BIGGS MD  Date:     06/13/17  Time:    20:52              Narrative:    Exam: CT scan of the abdomen and pelvis without  contrast    History:     Generalzed abdominal pain    Findings: Small bulla or blebs in the lung bases.  Small pericardial effusion.  A hernia.  The liver and spleen are normal.    The gallbladder is normal. The pancreas is unremarkable.    No adrenal masses are identified.    No obstructing kidney stones hydronephrosis or renal masses are appreciated.  3 mm nonobstructing stone left kidney.  The Aorta is atherosclerotic.    Urinary bladder appears normal.    No multiple loops of mildly dilated small bowel in the midabdomen noted with collapsed distal small bowel suggesting partial developing obstruction.  Sigmoid diverticulosis.    No significant osseous findings.  Postop change lumbar spine.                             X-Ray Abdomen Flat And Erect (Final result)  Result time 06/13/17 16:31:31    Final result by Ricardo Dailey III, MD (06/13/17 16:31:31)                 Impression:     Small bowel dilation suggesting small bowel traction..      Electronically signed by: RICARDO DAILEY MD  Date:     06/13/17  Time:    16:31              Narrative:    XR ABDOMEN FLAT AND ERECT    Indication: Abdominal Pain    Findings: There is moderate fairly diffuse small bowel dilation.  There is no significant colonic dilation.  No free air is identified.  Lumbar fusion changes are again noted.  Stable small pelvic phleboliths.                             X-Ray Knee Complete 4 Or More Views Bilat (Final result)  Result time 06/13/17 16:34:29    Final result by Ricardo Dailey III, MD (06/13/17 16:34:29)                 Impression:     Severe bilateral tricompartmental osteoarthritis.  Osteochondral lesion of the left medial femoral condyle.      Electronically signed by: RICARDO DAILEY MD  Date:     06/13/17  Time:    16:34              Narrative:    XR KNEE COMP 4 OR MORE VIEWS BILAT    Clinical History:  M25.561 Pain in right knee; M25.562 Pain in left knee    Findings: There is a 1.4 cm osteochondral defect of the left medial  femoral condyle with approximately 4 mm of articular collapse.  No acute appearing fracture, dislocation or other acute injury is seen in either knee.  No significant joint effusion is identified.  There is severe tricompartmental osteoarthritis of both knees with joint space loss, subchondral degenerative cystic change and prominent marginal osteophytes most advanced in the medial femorotibial compartment.  There is a mild associated genu varus deformity.                             The EKG was ordered, reviewed, and independently interpreted by the ED provider.  Interpretation time: 15:30  Rate: 71 BPM  Rhythm: normal sinus rhythm  Interpretation: Left ventricular hypertrophy with repolarization abnormality. No STEMI.           The Emergency Provider reviewed the vital signs and test results, which are outlined above.    ED Discussion     4:00 PM: Dr. Dutton transfers care of pt to Dr. Alvarado, pending lab and imaging results.    5:19 PM: Re-evaluated pt. Pt is resting comfortably and is in no acute distress. Upon reexamination pt had some mild lower abd tenderness. Discussed with pt performing a CT scan on her abd.  D/w pt all pertinent results. D/w pt any concerns expressed at this time. Answered all questions. Pt expresses understanding at this time.     9:56 PM: Pt states that she vomited before being taken for her CT scan.    10:25 PM: Dr. Alvarado discussed the pt's case with Doris Camarillo NP (Hospital Medicine) who recommends adding a mag and phos onto her labs. If pt meets admission criteria, admit to Dr. Garcia.    10:33 PM: Re-evaluated pt. I have discussed test results, shared treatment plan, and the need for admission with patient and family at bedside. Pt and family express understanding at this time and agree with all information. All questions answered. Pt and family have no further questions or concerns at this time. Pt is ready for admit.    10:54 PM: Discussed case with Doris Camarillo  NP (Hospital Medicine). [facility], NP agrees with current care and management of pt and accepts admission.   Admitting Service: Hospital medicine   Admitting Physician: Dr. Garcia  Admit to: Med-Surg      ED Medication(s):  Medications   ondansetron injection 4 mg (4 mg Intravenous Not Given 6/13/17 2200)   heparin (porcine) injection 7,500 Units (not administered)   methocarbamol tablet 750 mg (not administered)   0.9%  NaCl infusion ( Intravenous New Bag 6/14/17 0029)   sodium chloride 0.9% bolus 500 mL (0 mLs Intravenous Stopped 6/13/17 1754)   oxycodone-acetaminophen  mg per tablet 1 tablet (1 tablet Oral Given 6/13/17 1632)   ondansetron injection 4 mg (4 mg Intravenous Given 6/13/17 1634)   sodium chloride 0.9% bolus 500 mL (0 mLs Intravenous Stopped 6/13/17 1852)   omnipaque 350 iohexol 30 mL (30 mLs Oral Given 6/13/17 1740)       New Prescriptions    No medications on file             Medical Decision Making    Medical Decision Making:   Clinical Tests:   Lab Tests: Ordered and Reviewed  Radiological Study: Ordered and Reviewed  Medical Tests: Ordered and Reviewed           Scribe Attestation:   Scribe #1: I performed the above scribed service and the documentation accurately describes the services I performed. I attest to the accuracy of the note.    Attending:   Physician Attestation Statement for Scribe #1: I, Moises Dutton Jr., MD, personally performed the services described in this documentation, as scribed by Dalton Ortiz, in my presence, and it is both accurate and complete.       Scribe Attestation:   Scribe #2: I performed the above scribed service and the documentation accurately describes the services I performed. I attest to the accuracy of the note.    Attending Attestation:           Physician Attestation for Scribe:    Physician Attestation Statement for Scribe #2: I, Vanda Alvarado MD, reviewed documentation, as scribed by Corinne Mack in my presence, and it is both accurate  and complete. I also acknowledge and confirm the content of the note done by Kurtisibe #1.          Clinical Impression       ICD-10-CM ICD-9-CM   1. Partial small bowel obstruction K56.69 560.9   2. Bilateral knee pain M25.561 719.46    M25.562    3. Dizziness R42 780.4   4. Abdominal pain R10.9 789.00   5. Nausea and vomiting, intractability of vomiting not specified, unspecified vomiting type R11.2 787.01   6. Acute on chronic renal failure N17.9 584.9    N18.9 585.9       Disposition:   Disposition: Admitted  Condition: Jose Alvarado MD  06/14/17 0203

## 2017-06-13 NOTE — PROGRESS NOTES
Subjective:       Patient ID: Kelsey Booker is a 61 y.o. female.    Chief Complaint: Dizziness; Emesis; and Diarrhea      HPI   Ms. Booker presents to clinic today for complaints of weakness , vomiting and diarrhea.   She states she has not had fever.   She states the symptoms started on Sunday.  She states she only has been eating watermelon since Sunday.   She states the vomiting and diarrhea , have been non stop.   She has not taken any medicine for this.   She states she did go out to eat on Saturday and one of her grandchildren were also sick.       Review of Systems   Constitutional: Positive for fatigue.   Gastrointestinal: Positive for diarrhea and vomiting.       Medication List with Changes/Refills   Current Medications    BENZONATATE (TESSALON) 100 MG CAPSULE    take 1 capsule by mouth twice a day if needed for cough    BUTALBITAL-ACETAMINOPHEN-CAFFEINE -40 MG (FIORICET, ESGIC) -40 MG PER TABLET    Take 1 tablet by mouth daily as needed for Pain.    CRESTOR 20 MG TABLET    Take 1 tablet (20 mg total) by mouth every evening.    DILTIAZEM (TIAZAC) 360 MG CPSR    Take 1 capsule (360 mg total) by mouth once daily.    ESOMEPRAZOLE (NEXIUM) 40 MG CAPSULE    Take 1 capsule by mouth once daily.    FERROUS SULFATE 325 MG (65 MG IRON) TAB TABLET    Take 1 tablet by mouth 2 (two) times daily.    FUROSEMIDE (LASIX) 40 MG TABLET    Take 1 tablet (40 mg total) by mouth 2 (two) times daily.    GABAPENTIN (NEURONTIN) 300 MG CAPSULE    Take 300 mg by mouth 2 (two) times daily. 2 tablets in the AM and 3 tablets at night    HYDRALAZINE (APRESOLINE) 100 MG TABLET    Take 1 tablet (100 mg total) by mouth 3 (three) times daily.    HYOSCYAMINE (LEVSIN/SL) 0.125 MG SUBL    Place 1 tablet (0.125 mg total) under the tongue every 4 (four) hours as needed.    LOSARTAN (COZAAR) 25 MG TABLET    Take 1 tablet (25 mg total) by mouth once daily.    METHOCARBAMOL (ROBAXIN) 750 MG TAB    Take 1 tablet by mouth 3  (three) times daily as needed.    METOPROLOL TARTRATE (LOPRESSOR) 50 MG TABLET    Take 1 tablet (50 mg total) by mouth 2 (two) times daily.    OXYCODONE 20 MG TAB    20 mg every 6 (six) hours as needed.     PROMETHAZINE (PHENERGAN) 12.5 MG TAB    Take 12.5 mg by mouth every 8 (eight) hours.    SERTRALINE (ZOLOFT) 50 MG TABLET    Take 1 tablet (50 mg total) by mouth once daily.    TERAZOSIN (HYTRIN) 2 MG CAPSULE    take 2 capsule by mouth at bedtime       Patient Active Problem List   Diagnosis    Essential hypertension    Hyperlipidemia    Gastroesophageal reflux disease    Morbid obesity with BMI of 40.0-44.9, adult    Tobacco use    Upper respiratory tract infection    Elevated troponin    ADHF (acute decompensated heart failure)    Accelerated hypertension    CKD (chronic kidney disease), stage IV    Partial small bowel obstruction         Objective:     Physical Exam   Constitutional: She is oriented to person, place, and time. She appears well-developed and well-nourished. No distress.   Appears weak   HENT:   Head: Normocephalic and atraumatic.   Eyes: EOM are normal. Right eye exhibits no discharge. Left eye exhibits no discharge.   Cardiovascular: Normal rate and regular rhythm.    Pulmonary/Chest: Effort normal and breath sounds normal. No respiratory distress. She has no wheezes.   Abdominal: There is no tenderness.   Neurological: She is alert and oriented to person, place, and time.   Skin: Skin is warm and dry. She is not diaphoretic. No erythema.   Psychiatric: She has a normal mood and affect.     Vitals:    06/13/17 1400   BP: 92/68   Pulse: 77   Resp: 18   Temp: 96.6 °F (35.9 °C)       Assessment/  PLAN       Vomiting and diarrhea    Hypotension, unspecified hypotension type    advised pt to go to ER as her blood pressure has not ever been this low and weakness exuded by patient and concern for dehydration     Beau Gao MD  [facility]

## 2017-06-14 LAB
ALBUMIN SERPL BCP-MCNC: 3.1 G/DL
ALP SERPL-CCNC: 116 U/L
ALT SERPL W/O P-5'-P-CCNC: 25 U/L
ANION GAP SERPL CALC-SCNC: 10 MMOL/L
AST SERPL-CCNC: 31 U/L
BASOPHILS # BLD AUTO: 0.01 K/UL
BASOPHILS NFR BLD: 0.1 %
BILIRUB SERPL-MCNC: 0.5 MG/DL
BUN SERPL-MCNC: 51 MG/DL
CALCIUM SERPL-MCNC: 10.7 MG/DL
CHLORIDE SERPL-SCNC: 113 MMOL/L
CO2 SERPL-SCNC: 17 MMOL/L
CREAT SERPL-MCNC: 3 MG/DL
DIFFERENTIAL METHOD: ABNORMAL
EOSINOPHIL # BLD AUTO: 0 K/UL
EOSINOPHIL NFR BLD: 0.1 %
ERYTHROCYTE [DISTWIDTH] IN BLOOD BY AUTOMATED COUNT: 18 %
EST. GFR  (AFRICAN AMERICAN): 19 ML/MIN/1.73 M^2
EST. GFR  (NON AFRICAN AMERICAN): 16 ML/MIN/1.73 M^2
GLUCOSE SERPL-MCNC: 87 MG/DL
HCT VFR BLD AUTO: 30.3 %
HGB BLD-MCNC: 9.7 G/DL
LYMPHOCYTES # BLD AUTO: 0.8 K/UL
LYMPHOCYTES NFR BLD: 10.6 %
MAGNESIUM SERPL-MCNC: 2.4 MG/DL
MCH RBC QN AUTO: 27.7 PG
MCHC RBC AUTO-ENTMCNC: 32 %
MCV RBC AUTO: 87 FL
MONOCYTES # BLD AUTO: 0.8 K/UL
MONOCYTES NFR BLD: 10.3 %
NEUTROPHILS # BLD AUTO: 5.8 K/UL
NEUTROPHILS NFR BLD: 78.9 %
PHOSPHATE SERPL-MCNC: 3.2 MG/DL
PLATELET # BLD AUTO: 180 K/UL
PMV BLD AUTO: 9.6 FL
POTASSIUM SERPL-SCNC: 4.7 MMOL/L
PROT SERPL-MCNC: 7.9 G/DL
RBC # BLD AUTO: 3.5 M/UL
SODIUM SERPL-SCNC: 140 MMOL/L
WBC # BLD AUTO: 7.39 K/UL

## 2017-06-14 PROCEDURE — 25000003 PHARM REV CODE 250: Performed by: NURSE PRACTITIONER

## 2017-06-14 PROCEDURE — 25000003 PHARM REV CODE 250: Performed by: EMERGENCY MEDICINE

## 2017-06-14 PROCEDURE — 25000003 PHARM REV CODE 250: Performed by: INTERNAL MEDICINE

## 2017-06-14 PROCEDURE — 83735 ASSAY OF MAGNESIUM: CPT

## 2017-06-14 PROCEDURE — 63600175 PHARM REV CODE 636 W HCPCS: Performed by: NURSE PRACTITIONER

## 2017-06-14 PROCEDURE — 85025 COMPLETE CBC W/AUTO DIFF WBC: CPT

## 2017-06-14 PROCEDURE — 11000001 HC ACUTE MED/SURG PRIVATE ROOM

## 2017-06-14 PROCEDURE — 80053 COMPREHEN METABOLIC PANEL: CPT

## 2017-06-14 PROCEDURE — 84100 ASSAY OF PHOSPHORUS: CPT

## 2017-06-14 RX ORDER — ONDANSETRON 8 MG/1
8 TABLET, ORALLY DISINTEGRATING ORAL EVERY 8 HOURS PRN
Status: DISCONTINUED | OUTPATIENT
Start: 2017-06-14 | End: 2017-06-15 | Stop reason: HOSPADM

## 2017-06-14 RX ORDER — FAMOTIDINE 20 MG/50ML
20 INJECTION, SOLUTION INTRAVENOUS DAILY
Status: DISCONTINUED | OUTPATIENT
Start: 2017-06-15 | End: 2017-06-15 | Stop reason: HOSPADM

## 2017-06-14 RX ORDER — HEPARIN SODIUM 5000 [USP'U]/ML
7500 INJECTION, SOLUTION INTRAVENOUS; SUBCUTANEOUS EVERY 8 HOURS
Status: DISCONTINUED | OUTPATIENT
Start: 2017-06-14 | End: 2017-06-14

## 2017-06-14 RX ORDER — FUROSEMIDE 40 MG/1
40 TABLET ORAL 2 TIMES DAILY
Status: DISCONTINUED | OUTPATIENT
Start: 2017-06-14 | End: 2017-06-14

## 2017-06-14 RX ORDER — METOPROLOL TARTRATE 50 MG/1
50 TABLET ORAL 2 TIMES DAILY
Status: DISCONTINUED | OUTPATIENT
Start: 2017-06-14 | End: 2017-06-15 | Stop reason: HOSPADM

## 2017-06-14 RX ORDER — SODIUM CHLORIDE 9 MG/ML
INJECTION, SOLUTION INTRAVENOUS CONTINUOUS
Status: DISCONTINUED | OUTPATIENT
Start: 2017-06-14 | End: 2017-06-15 | Stop reason: HOSPADM

## 2017-06-14 RX ORDER — BUTALBITAL, ACETAMINOPHEN AND CAFFEINE 50; 325; 40 MG/1; MG/1; MG/1
1 TABLET ORAL EVERY 6 HOURS PRN
Status: DISCONTINUED | OUTPATIENT
Start: 2017-06-14 | End: 2017-06-15 | Stop reason: HOSPADM

## 2017-06-14 RX ORDER — ENOXAPARIN SODIUM 100 MG/ML
30 INJECTION SUBCUTANEOUS EVERY 24 HOURS
Status: DISCONTINUED | OUTPATIENT
Start: 2017-06-14 | End: 2017-06-15 | Stop reason: HOSPADM

## 2017-06-14 RX ORDER — FAMOTIDINE 10 MG/ML
20 INJECTION INTRAVENOUS EVERY 12 HOURS
Status: DISCONTINUED | OUTPATIENT
Start: 2017-06-14 | End: 2017-06-14

## 2017-06-14 RX ORDER — METHOCARBAMOL 750 MG/1
750 TABLET, FILM COATED ORAL 3 TIMES DAILY
Status: DISCONTINUED | OUTPATIENT
Start: 2017-06-14 | End: 2017-06-15 | Stop reason: HOSPADM

## 2017-06-14 RX ORDER — LOSARTAN POTASSIUM 25 MG/1
25 TABLET ORAL DAILY
Status: DISCONTINUED | OUTPATIENT
Start: 2017-06-14 | End: 2017-06-15 | Stop reason: HOSPADM

## 2017-06-14 RX ORDER — TRAMADOL HYDROCHLORIDE 50 MG/1
50 TABLET ORAL EVERY 6 HOURS PRN
Status: DISCONTINUED | OUTPATIENT
Start: 2017-06-14 | End: 2017-06-15 | Stop reason: HOSPADM

## 2017-06-14 RX ORDER — HYDRALAZINE HYDROCHLORIDE 50 MG/1
100 TABLET, FILM COATED ORAL 3 TIMES DAILY
Status: DISCONTINUED | OUTPATIENT
Start: 2017-06-14 | End: 2017-06-15 | Stop reason: HOSPADM

## 2017-06-14 RX ORDER — OXYCODONE AND ACETAMINOPHEN 5; 325 MG/1; MG/1
1 TABLET ORAL ONCE
Status: COMPLETED | OUTPATIENT
Start: 2017-06-14 | End: 2017-06-14

## 2017-06-14 RX ORDER — HYOSCYAMINE SULFATE 0.12 MG/1
0.12 TABLET SUBLINGUAL EVERY 4 HOURS PRN
Status: DISCONTINUED | OUTPATIENT
Start: 2017-06-14 | End: 2017-06-15 | Stop reason: HOSPADM

## 2017-06-14 RX ORDER — FUROSEMIDE 40 MG/1
40 TABLET ORAL 2 TIMES DAILY
Status: DISCONTINUED | OUTPATIENT
Start: 2017-06-14 | End: 2017-06-15 | Stop reason: HOSPADM

## 2017-06-14 RX ORDER — DILTIAZEM HYDROCHLORIDE 180 MG/1
360 CAPSULE, COATED, EXTENDED RELEASE ORAL DAILY
Status: DISCONTINUED | OUTPATIENT
Start: 2017-06-14 | End: 2017-06-15 | Stop reason: HOSPADM

## 2017-06-14 RX ADMIN — SODIUM CHLORIDE: 0.9 INJECTION, SOLUTION INTRAVENOUS at 09:06

## 2017-06-14 RX ADMIN — HYDRALAZINE HYDROCHLORIDE 100 MG: 50 TABLET, FILM COATED ORAL at 10:06

## 2017-06-14 RX ADMIN — LOSARTAN POTASSIUM 25 MG: 25 TABLET, FILM COATED ORAL at 08:06

## 2017-06-14 RX ADMIN — FUROSEMIDE 40 MG: 40 TABLET ORAL at 09:06

## 2017-06-14 RX ADMIN — ENOXAPARIN SODIUM 30 MG: 100 INJECTION SUBCUTANEOUS at 05:06

## 2017-06-14 RX ADMIN — METHOCARBAMOL 750 MG: 750 TABLET ORAL at 10:06

## 2017-06-14 RX ADMIN — METHOCARBAMOL 750 MG: 750 TABLET ORAL at 03:06

## 2017-06-14 RX ADMIN — FUROSEMIDE 40 MG: 40 TABLET ORAL at 05:06

## 2017-06-14 RX ADMIN — DILTIAZEM HYDROCHLORIDE 360 MG: 180 CAPSULE, COATED, EXTENDED RELEASE ORAL at 08:06

## 2017-06-14 RX ADMIN — ONDANSETRON 8 MG: 8 TABLET, ORALLY DISINTEGRATING ORAL at 03:06

## 2017-06-14 RX ADMIN — SODIUM CHLORIDE: 0.9 INJECTION, SOLUTION INTRAVENOUS at 12:06

## 2017-06-14 RX ADMIN — HYOSCYAMINE SULFATE 0.12 MG: 0.12 TABLET ORAL at 08:06

## 2017-06-14 RX ADMIN — METOPROLOL TARTRATE 50 MG: 50 TABLET ORAL at 08:06

## 2017-06-14 RX ADMIN — FAMOTIDINE 20 MG: 10 INJECTION, SOLUTION INTRAVENOUS at 08:06

## 2017-06-14 RX ADMIN — HYOSCYAMINE SULFATE 0.12 MG: 0.12 TABLET ORAL at 03:06

## 2017-06-14 RX ADMIN — SODIUM CHLORIDE: 0.9 INJECTION, SOLUTION INTRAVENOUS at 10:06

## 2017-06-14 RX ADMIN — OXYCODONE HYDROCHLORIDE AND ACETAMINOPHEN 1 TABLET: 5; 325 TABLET ORAL at 04:06

## 2017-06-14 RX ADMIN — METOPROLOL TARTRATE 50 MG: 50 TABLET ORAL at 10:06

## 2017-06-14 RX ADMIN — HEPARIN SODIUM 7500 UNITS: 5000 INJECTION, SOLUTION INTRAVENOUS; SUBCUTANEOUS at 06:06

## 2017-06-14 RX ADMIN — TRAMADOL HYDROCHLORIDE 50 MG: 50 TABLET, FILM COATED ORAL at 11:06

## 2017-06-14 RX ADMIN — HYDRALAZINE HYDROCHLORIDE 100 MG: 50 TABLET, FILM COATED ORAL at 03:06

## 2017-06-14 NOTE — H&P
[facility]  History & Physical    Patient Name: Kelsey Booker  MRN: [id number]  Admission Date: 6/13/2017  Attending Physician: Shweta Garcia MD   Primary Care Provider: Silvana Stover MD         Patient information was obtained from patient and ER records.     Subjective:     Principal Problem:Partial small bowel obstruction    Chief Complaint:   Chief Complaint   Patient presents with    Weakness     sent by PCP  for hypotension, low O2, pt reports recent fall, and having vomiting and diarrhea         HPI: The patient is a 61-year old female presenting with diarrhea, nausea, vomiting and abdominal pain to the Er. She states she started feeling sick about 2 days ago and experienced a combination of vomiting, nausea, abdominal pain. Her home medications however she she has been on hyoscyamine since at least 6/7/2017. She lives at home, is on home oxygen, sill smokes half a pack a day and was recently admitted for decompensated CHF and found to have an EF of 60-65% about 32 months ago. CT during this admission showed dilated bowel loops and her creatinine was elevated to 3 form her baseline of around 2.     Past Medical History:   Diagnosis Date    Anemia     CHF (congestive heart failure)     Follows with Dr. Levar Reyes, cardiologist    Chronic back pain     Dr. Jonathon Tristan, pain management specialist, at [facility].    CKD (chronic kidney disease)     Follows with Dr. Mccullough, nephrologist    GERD (gastroesophageal reflux disease)     High cholesterol     History of abnormal cervical Pap smear     HTN (hypertension)     Postmenopausal     Tobacco use        Past Surgical History:   Procedure Laterality Date    BACK SURGERY      DILATION AND CURETTAGE OF UTERUS      partial hysterectomy      Due to Fibroids       Review of patient's allergies indicates:  No Known Allergies    No current facility-administered medications on file prior to encounter.       Current Outpatient Prescriptions on File Prior to Encounter   Medication Sig    benzonatate (TESSALON) 100 MG capsule take 1 capsule by mouth twice a day if needed for cough    butalbital-acetaminophen-caffeine -40 mg (FIORICET, ESGIC) -40 mg per tablet Take 1 tablet by mouth daily as needed for Pain.    CRESTOR 20 mg tablet Take 1 tablet (20 mg total) by mouth every evening.    diltiaZEM (TIAZAC) 360 MG CpSR Take 1 capsule (360 mg total) by mouth once daily.    esomeprazole (NEXIUM) 40 MG capsule Take 1 capsule by mouth once daily.    ferrous sulfate 325 mg (65 mg iron) Tab tablet Take 1 tablet by mouth 2 (two) times daily.    furosemide (LASIX) 40 MG tablet Take 1 tablet (40 mg total) by mouth 2 (two) times daily.    gabapentin (NEURONTIN) 300 MG capsule Take 300 mg by mouth 2 (two) times daily. 2 tablets in the AM and 3 tablets at night    hydrALAZINE (APRESOLINE) 100 MG tablet Take 1 tablet (100 mg total) by mouth 3 (three) times daily.    hyoscyamine (LEVSIN/SL) 0.125 mg Subl Place 1 tablet (0.125 mg total) under the tongue every 4 (four) hours as needed.    losartan (COZAAR) 25 MG tablet Take 1 tablet (25 mg total) by mouth once daily.    methocarbamol (ROBAXIN) 750 MG Tab Take 1 tablet by mouth 3 (three) times daily as needed.    metoprolol tartrate (LOPRESSOR) 50 MG tablet Take 1 tablet (50 mg total) by mouth 2 (two) times daily.    oxycodone 20 mg Tab 20 mg every 6 (six) hours as needed.     promethazine (PHENERGAN) 12.5 MG Tab Take 12.5 mg by mouth every 8 (eight) hours.    sertraline (ZOLOFT) 50 MG tablet Take 1 tablet (50 mg total) by mouth once daily.    terazosin (HYTRIN) 2 MG capsule take 2 capsule by mouth at bedtime     Family History     Problem Relation (Age of Onset)    Diabetes Cousin    Heart disease Father, Sister, Brother    Hypertension Brother    Thrombosis Mother        Social History Main Topics    Smoking status: Current Every Day Smoker     Packs/day:  1.00     Types: Cigarettes     Start date: 1/1/1985    Smokeless tobacco: Never Used      Comment: Interested in smoking cessation program.    Alcohol use No    Drug use: No    Sexual activity: No     Review of Systems   Constitutional: Negative.    HENT: Negative.    Eyes: Negative.    Respiratory: Negative.    Cardiovascular: Negative.    Gastrointestinal: Positive for abdominal pain, diarrhea, nausea and vomiting. Negative for abdominal distention, anal bleeding, blood in stool, constipation and rectal pain.   Endocrine: Negative.    Genitourinary: Negative.    Musculoskeletal: Negative.    Skin: Negative.    Allergic/Immunologic: Negative.    Neurological: Negative.    Hematological: Negative.    Psychiatric/Behavioral: Negative.      Objective:     Vital Signs (Most Recent):  Temp: 98.6 °F (37 °C) (06/13/17 1645)  Pulse: 71 (06/13/17 2245)  Resp: 18 (06/13/17 2245)  BP: (!) 151/90 (06/13/17 2245)  SpO2: 99 % (06/13/17 2245) Vital Signs (24h Range):  Temp:  [96.6 °F (35.9 °C)-99.1 °F (37.3 °C)] 98.6 °F (37 °C)  Pulse:  [56-77] 71  Resp:  [17-21] 18  SpO2:  [90 %-100 %] 99 %  BP: ()/(65-90) 151/90     Weight: 111.1 kg (245 lb)  Body mass index is 40.77 kg/m².    Physical Exam   Constitutional: She is oriented to person, place, and time. She appears well-developed and well-nourished.   HENT:   Head: Normocephalic and atraumatic.   Right Ear: External ear normal.   Left Ear: External ear normal.   Nose: Nose normal.   Mouth/Throat: Oropharynx is clear and moist.   Eyes: Conjunctivae and EOM are normal. Pupils are equal, round, and reactive to light. Right eye exhibits no discharge. Left eye exhibits discharge. No scleral icterus.   Neck: Normal range of motion. Neck supple. No JVD present. No tracheal deviation present. No thyromegaly present.   Cardiovascular: Normal rate, regular rhythm, normal heart sounds and intact distal pulses.  Exam reveals no gallop and no friction rub.    No murmur  heard.  Pulmonary/Chest: No stridor. No respiratory distress. She has no wheezes. She has no rales. She exhibits no tenderness.   Abdominal: Soft. Bowel sounds are normal. She exhibits no distension and no mass. There is no tenderness. There is no rebound and no guarding. No hernia.   Musculoskeletal: She exhibits no edema, tenderness or deformity.   Lymphadenopathy:     She has no cervical adenopathy.   Neurological: She is alert and oriented to person, place, and time. She displays normal reflexes. No cranial nerve deficit. She exhibits normal muscle tone. Coordination normal.   Skin: Skin is warm and dry. Capillary refill takes less than 2 seconds.   Psychiatric: She has a normal mood and affect. Her behavior is normal. Judgment and thought content normal.        Significant Labs:   A1C: No results for input(s): HGBA1C in the last 4320 hours.  Blood Culture: No results for input(s): LABBLOO in the last 48 hours.  CBC:   Recent Labs  Lab 06/13/17  1631   WBC 8.84   HGB 9.3*   HCT 29.2*        CMP:   Recent Labs  Lab 06/13/17  1631      K 3.7   *   CO2 17*   GLU 94   BUN 54*   CREATININE 3.2*   CALCIUM 10.5   PROT 7.5   ALBUMIN 3.1*   BILITOT 0.5   ALKPHOS 117   AST 21   ALT 16   ANIONGAP 10   EGFRNONAA 15*     Cardiac Markers: No results for input(s): CKMB, MYOGLOBIN, BNP, TROPISTAT in the last 48 hours.  Lactic Acid: No results for input(s): LACTATE in the last 48 hours.  Lipase:   Recent Labs  Lab 06/13/17  1631   LIPASE 48     TSH: No results for input(s): TSH in the last 4320 hours.    Significant Imaging:   Imaging Results          CT Abdomen Pelvis  Without Contrast (Final result)  Result time 06/13/17 20:52:20   Procedure changed from CT Abdomen Pelvis With Contrast     Final result by Jonathon Greene MD (06/13/17 20:52:20)                 Impression:     Findings concerning for developing or partial small bowel obstruction.  Sigmoid diverticulosis.  Small pericardial effusion.  Hiatal  hernia.  3 mm nonobstructing left kidney stone.    All CT scans at this facility use dose modulation, iterative reconstruction, and/or weight based dosing when appropriate to reduce radiation dose to as low as reasonably achievable.      Electronically signed by: ELIAS BIGGS MD  Date:     06/13/17  Time:    20:52              Narrative:    Exam: CT scan of the abdomen and pelvis without contrast    History:     Generalzed abdominal pain    Findings: Small bulla or blebs in the lung bases.  Small pericardial effusion.  A hernia.  The liver and spleen are normal.    The gallbladder is normal. The pancreas is unremarkable.    No adrenal masses are identified.    No obstructing kidney stones hydronephrosis or renal masses are appreciated.  3 mm nonobstructing stone left kidney.  The Aorta is atherosclerotic.    Urinary bladder appears normal.    No multiple loops of mildly dilated small bowel in the midabdomen noted with collapsed distal small bowel suggesting partial developing obstruction.  Sigmoid diverticulosis.    No significant osseous findings.  Postop change lumbar spine.                             X-Ray Abdomen Flat And Erect (Final result)  Result time 06/13/17 16:31:31    Final result by Ricardo Dailey III, MD (06/13/17 16:31:31)                 Impression:     Small bowel dilation suggesting small bowel traction..      Electronically signed by: RICARDO DAILEY MD  Date:     06/13/17  Time:    16:31              Narrative:    XR ABDOMEN FLAT AND ERECT    Indication: Abdominal Pain    Findings: There is moderate fairly diffuse small bowel dilation.  There is no significant colonic dilation.  No free air is identified.  Lumbar fusion changes are again noted.  Stable small pelvic phleboliths.                             X-Ray Knee Complete 4 Or More Views Bilat (Final result)  Result time 06/13/17 16:34:29    Final result by Ricardo Dailey III, MD (06/13/17 16:34:29)                 Impression:     Severe  bilateral tricompartmental osteoarthritis.  Osteochondral lesion of the left medial femoral condyle.      Electronically signed by: RICARDO DAILEY MD  Date:     06/13/17  Time:    16:34              Narrative:    XR KNEE COMP 4 OR MORE VIEWS BILAT    Clinical History:  M25.561 Pain in right knee; M25.562 Pain in left knee    Findings: There is a 1.4 cm osteochondral defect of the left medial femoral condyle with approximately 4 mm of articular collapse.  No acute appearing fracture, dislocation or other acute injury is seen in either knee.  No significant joint effusion is identified.  There is severe tricompartmental osteoarthritis of both knees with joint space loss, subchondral degenerative cystic change and prominent marginal osteophytes most advanced in the medial femorotibial compartment.  There is a mild associated genu varus deformity.                            Assessment/Plan:     * Partial small bowel obstruction    Rest bowels. IVF NS at 60cc/hour. Fluid I/O.          CKD (chronic kidney disease), stage IV    Will recheck BUN/Cr in am with hydration and expect improvement.             VTE Risk Mitigation     None        Shweta Garcia MD  Department of Hospital Medicine   [facility]

## 2017-06-14 NOTE — SUBJECTIVE & OBJECTIVE
Past Medical History:   Diagnosis Date    Anemia     CHF (congestive heart failure)     Follows with Dr. Levar Reyes, cardiologist    Chronic back pain     Dr. Jonathon Tristan, pain management specialist, at [facility].    CKD (chronic kidney disease)     Follows with Dr. Mccullough, nephrologist    GERD (gastroesophageal reflux disease)     High cholesterol     History of abnormal cervical Pap smear     HTN (hypertension)     Postmenopausal     Tobacco use        Past Surgical History:   Procedure Laterality Date    BACK SURGERY      DILATION AND CURETTAGE OF UTERUS      partial hysterectomy      Due to Fibroids       Review of patient's allergies indicates:  No Known Allergies    No current facility-administered medications on file prior to encounter.      Current Outpatient Prescriptions on File Prior to Encounter   Medication Sig    benzonatate (TESSALON) 100 MG capsule take 1 capsule by mouth twice a day if needed for cough    butalbital-acetaminophen-caffeine -40 mg (FIORICET, ESGIC) -40 mg per tablet Take 1 tablet by mouth daily as needed for Pain.    CRESTOR 20 mg tablet Take 1 tablet (20 mg total) by mouth every evening.    diltiaZEM (TIAZAC) 360 MG CpSR Take 1 capsule (360 mg total) by mouth once daily.    esomeprazole (NEXIUM) 40 MG capsule Take 1 capsule by mouth once daily.    ferrous sulfate 325 mg (65 mg iron) Tab tablet Take 1 tablet by mouth 2 (two) times daily.    furosemide (LASIX) 40 MG tablet Take 1 tablet (40 mg total) by mouth 2 (two) times daily.    gabapentin (NEURONTIN) 300 MG capsule Take 300 mg by mouth 2 (two) times daily. 2 tablets in the AM and 3 tablets at night    hydrALAZINE (APRESOLINE) 100 MG tablet Take 1 tablet (100 mg total) by mouth 3 (three) times daily.    hyoscyamine (LEVSIN/SL) 0.125 mg Subl Place 1 tablet (0.125 mg total) under the tongue every 4 (four) hours as needed.    losartan (COZAAR) 25 MG tablet Take 1 tablet (25 mg total)  by mouth once daily.    methocarbamol (ROBAXIN) 750 MG Tab Take 1 tablet by mouth 3 (three) times daily as needed.    metoprolol tartrate (LOPRESSOR) 50 MG tablet Take 1 tablet (50 mg total) by mouth 2 (two) times daily.    oxycodone 20 mg Tab 20 mg every 6 (six) hours as needed.     promethazine (PHENERGAN) 12.5 MG Tab Take 12.5 mg by mouth every 8 (eight) hours.    sertraline (ZOLOFT) 50 MG tablet Take 1 tablet (50 mg total) by mouth once daily.    terazosin (HYTRIN) 2 MG capsule take 2 capsule by mouth at bedtime     Family History     Problem Relation (Age of Onset)    Diabetes Cousin    Heart disease Father, Sister, Brother    Hypertension Brother    Thrombosis Mother        Social History Main Topics    Smoking status: Current Every Day Smoker     Packs/day: 1.00     Types: Cigarettes     Start date: 1/1/1985    Smokeless tobacco: Never Used      Comment: Interested in smoking cessation program.    Alcohol use No    Drug use: No    Sexual activity: No     Review of Systems   Constitutional: Negative.    HENT: Negative.    Eyes: Negative.    Respiratory: Negative.    Cardiovascular: Negative.    Gastrointestinal: Positive for abdominal pain, diarrhea, nausea and vomiting. Negative for abdominal distention, anal bleeding, blood in stool, constipation and rectal pain.   Endocrine: Negative.    Genitourinary: Negative.    Musculoskeletal: Negative.    Skin: Negative.    Allergic/Immunologic: Negative.    Neurological: Negative.    Hematological: Negative.    Psychiatric/Behavioral: Negative.      Objective:     Vital Signs (Most Recent):  Temp: 98.6 °F (37 °C) (06/13/17 1645)  Pulse: 71 (06/13/17 2245)  Resp: 18 (06/13/17 2245)  BP: (!) 151/90 (06/13/17 2245)  SpO2: 99 % (06/13/17 2245) Vital Signs (24h Range):  Temp:  [96.6 °F (35.9 °C)-99.1 °F (37.3 °C)] 98.6 °F (37 °C)  Pulse:  [56-77] 71  Resp:  [17-21] 18  SpO2:  [90 %-100 %] 99 %  BP: ()/(65-90) 151/90     Weight: 111.1 kg (245 lb)  Body  mass index is 40.77 kg/m².    Physical Exam   Constitutional: She is oriented to person, place, and time. She appears well-developed and well-nourished.   HENT:   Head: Normocephalic and atraumatic.   Right Ear: External ear normal.   Left Ear: External ear normal.   Nose: Nose normal.   Mouth/Throat: Oropharynx is clear and moist.   Eyes: Conjunctivae and EOM are normal. Pupils are equal, round, and reactive to light. Right eye exhibits no discharge. Left eye exhibits discharge. No scleral icterus.   Neck: Normal range of motion. Neck supple. No JVD present. No tracheal deviation present. No thyromegaly present.   Cardiovascular: Normal rate, regular rhythm, normal heart sounds and intact distal pulses.  Exam reveals no gallop and no friction rub.    No murmur heard.  Pulmonary/Chest: No stridor. No respiratory distress. She has no wheezes. She has no rales. She exhibits no tenderness.   Abdominal: Soft. Bowel sounds are normal. She exhibits no distension and no mass. There is no tenderness. There is no rebound and no guarding. No hernia.   Musculoskeletal: She exhibits no edema, tenderness or deformity.   Lymphadenopathy:     She has no cervical adenopathy.   Neurological: She is alert and oriented to person, place, and time. She displays normal reflexes. No cranial nerve deficit. She exhibits normal muscle tone. Coordination normal.   Skin: Skin is warm and dry. Capillary refill takes less than 2 seconds.   Psychiatric: She has a normal mood and affect. Her behavior is normal. Judgment and thought content normal.        Significant Labs:   A1C: No results for input(s): HGBA1C in the last 4320 hours.  Blood Culture: No results for input(s): LABBLOO in the last 48 hours.  CBC:   Recent Labs  Lab 06/13/17  1631   WBC 8.84   HGB 9.3*   HCT 29.2*        CMP:   Recent Labs  Lab 06/13/17  1631      K 3.7   *   CO2 17*   GLU 94   BUN 54*   CREATININE 3.2*   CALCIUM 10.5   PROT 7.5   ALBUMIN 3.1*    BILITOT 0.5   ALKPHOS 117   AST 21   ALT 16   ANIONGAP 10   EGFRNONAA 15*     Cardiac Markers: No results for input(s): CKMB, MYOGLOBIN, BNP, TROPISTAT in the last 48 hours.  Lactic Acid: No results for input(s): LACTATE in the last 48 hours.  Lipase:   Recent Labs  Lab 06/13/17  1631   LIPASE 48     TSH: No results for input(s): TSH in the last 4320 hours.    Significant Imaging:   Imaging Results          CT Abdomen Pelvis  Without Contrast (Final result)  Result time 06/13/17 20:52:20   Procedure changed from CT Abdomen Pelvis With Contrast     Final result by Elias Biggs MD (06/13/17 20:52:20)                 Impression:     Findings concerning for developing or partial small bowel obstruction.  Sigmoid diverticulosis.  Small pericardial effusion.  Hiatal hernia.  3 mm nonobstructing left kidney stone.    All CT scans at this facility use dose modulation, iterative reconstruction, and/or weight based dosing when appropriate to reduce radiation dose to as low as reasonably achievable.      Electronically signed by: ELIAS BIGGS MD  Date:     06/13/17  Time:    20:52              Narrative:    Exam: CT scan of the abdomen and pelvis without contrast    History:     Generalzed abdominal pain    Findings: Small bulla or blebs in the lung bases.  Small pericardial effusion.  A hernia.  The liver and spleen are normal.    The gallbladder is normal. The pancreas is unremarkable.    No adrenal masses are identified.    No obstructing kidney stones hydronephrosis or renal masses are appreciated.  3 mm nonobstructing stone left kidney.  The Aorta is atherosclerotic.    Urinary bladder appears normal.    No multiple loops of mildly dilated small bowel in the midabdomen noted with collapsed distal small bowel suggesting partial developing obstruction.  Sigmoid diverticulosis.    No significant osseous findings.  Postop change lumbar spine.                             X-Ray Abdomen Flat And Erect (Final result)  Result  time 06/13/17 16:31:31    Final result by Ricardo Dailey III, MD (06/13/17 16:31:31)                 Impression:     Small bowel dilation suggesting small bowel traction..      Electronically signed by: RICARDO DAILEY MD  Date:     06/13/17  Time:    16:31              Narrative:    XR ABDOMEN FLAT AND ERECT    Indication: Abdominal Pain    Findings: There is moderate fairly diffuse small bowel dilation.  There is no significant colonic dilation.  No free air is identified.  Lumbar fusion changes are again noted.  Stable small pelvic phleboliths.                             X-Ray Knee Complete 4 Or More Views Bilat (Final result)  Result time 06/13/17 16:34:29    Final result by Ricardo Dailey III, MD (06/13/17 16:34:29)                 Impression:     Severe bilateral tricompartmental osteoarthritis.  Osteochondral lesion of the left medial femoral condyle.      Electronically signed by: RICARDO DAILEY MD  Date:     06/13/17  Time:    16:34              Narrative:    XR KNEE COMP 4 OR MORE VIEWS BILAT    Clinical History:  M25.561 Pain in right knee; M25.562 Pain in left knee    Findings: There is a 1.4 cm osteochondral defect of the left medial femoral condyle with approximately 4 mm of articular collapse.  No acute appearing fracture, dislocation or other acute injury is seen in either knee.  No significant joint effusion is identified.  There is severe tricompartmental osteoarthritis of both knees with joint space loss, subchondral degenerative cystic change and prominent marginal osteophytes most advanced in the medial femorotibial compartment.  There is a mild associated genu varus deformity.

## 2017-06-14 NOTE — PROGRESS NOTES
[facility] Hospital Medicine  Progress Note    Patient Name: Kelsey Booker  MRN: [id number]  Patient Class: IP- Inpatient   Admission Date: 6/13/2017  Length of Stay: 1 days  Attending Physician: Dayton Bruce MD  Primary Care Provider: Silvana Stover MD        Subjective:     Principal Problem:Partial small bowel obstruction    HPI:  The patient is a 61-year old female presenting with diarrhea, nausea, vomiting and abdominal pain to the Er. She states she started feeling sick about 2 days ago and experienced a combination of vomiting, nausea, abdominal pain. Her home medications however she she has been on hyoscyamine since at least 6/7/2017. She lives at home, is on home oxygen, sill smokes half a pack a day and was recently admitted for decompensated CHF and found to have an EF of 60-65% about 32 months ago. CT during this admission showed dilated bowel loops and her creatinine was elevated to 3 form her baseline of around 2.     Hospital Course:  No further vomiting. NPO. Repeat KUB showed very slight improvement in gas pattern. positive bowel sounds.    Past Medical History:   Diagnosis Date    Anemia     CHF (congestive heart failure)     Follows with Dr. Levar Reyes, cardiologist    Chronic back pain     Dr. Jonathon Tristan, pain management specialist, at [facility].    CKD (chronic kidney disease)     Follows with Dr. Mccullough, nephrologist    GERD (gastroesophageal reflux disease)     High cholesterol     History of abnormal cervical Pap smear     HTN (hypertension)     Postmenopausal     Tobacco use        Past Surgical History:   Procedure Laterality Date    BACK SURGERY      DILATION AND CURETTAGE OF UTERUS      partial hysterectomy      Due to Fibroids       Review of patient's allergies indicates:  No Known Allergies    No current facility-administered medications on file prior to encounter.      Current Outpatient Prescriptions on File Prior to Encounter    Medication Sig    benzonatate (TESSALON) 100 MG capsule take 1 capsule by mouth twice a day if needed for cough    butalbital-acetaminophen-caffeine -40 mg (FIORICET, ESGIC) -40 mg per tablet Take 1 tablet by mouth daily as needed for Pain.    CRESTOR 20 mg tablet Take 1 tablet (20 mg total) by mouth every evening.    diltiaZEM (TIAZAC) 360 MG CpSR Take 1 capsule (360 mg total) by mouth once daily.    esomeprazole (NEXIUM) 40 MG capsule Take 1 capsule by mouth once daily.    ferrous sulfate 325 mg (65 mg iron) Tab tablet Take 1 tablet by mouth 2 (two) times daily.    furosemide (LASIX) 40 MG tablet Take 1 tablet (40 mg total) by mouth 2 (two) times daily.    gabapentin (NEURONTIN) 300 MG capsule Take 300 mg by mouth 2 (two) times daily. 2 tablets in the AM and 3 tablets at night    hydrALAZINE (APRESOLINE) 100 MG tablet Take 1 tablet (100 mg total) by mouth 3 (three) times daily.    hyoscyamine (LEVSIN/SL) 0.125 mg Subl Place 1 tablet (0.125 mg total) under the tongue every 4 (four) hours as needed.    losartan (COZAAR) 25 MG tablet Take 1 tablet (25 mg total) by mouth once daily.    methocarbamol (ROBAXIN) 750 MG Tab Take 1 tablet by mouth 3 (three) times daily as needed.    metoprolol tartrate (LOPRESSOR) 50 MG tablet Take 1 tablet (50 mg total) by mouth 2 (two) times daily.    oxycodone 20 mg Tab 20 mg every 6 (six) hours as needed.     promethazine (PHENERGAN) 12.5 MG Tab Take 12.5 mg by mouth every 8 (eight) hours.    sertraline (ZOLOFT) 50 MG tablet Take 1 tablet (50 mg total) by mouth once daily.    terazosin (HYTRIN) 2 MG capsule take 2 capsule by mouth at bedtime     Family History     Problem Relation (Age of Onset)    Diabetes Cousin    Heart disease Father, Sister, Brother    Hypertension Brother    Thrombosis Mother        Social History Main Topics    Smoking status: Current Every Day Smoker     Packs/day: 1.00     Types: Cigarettes     Start date: 1/1/1985    Smokeless  tobacco: Never Used      Comment: Interested in smoking cessation program.    Alcohol use No    Drug use: No    Sexual activity: No     Review of Systems   Constitutional: Negative.    HENT: Negative.    Eyes: Negative.    Respiratory: Negative.    Cardiovascular: Negative.    Gastrointestinal: Positive for abdominal pain, diarrhea and nausea. Negative for abdominal distention, anal bleeding, blood in stool, constipation, rectal pain and vomiting.   Endocrine: Negative.    Genitourinary: Negative.    Musculoskeletal: Negative.    Skin: Negative.    Allergic/Immunologic: Negative.    Neurological: Negative.    Hematological: Negative.    Psychiatric/Behavioral: Negative.      Past Medical History:   Diagnosis Date    Anemia     CHF (congestive heart failure)     Follows with Dr. Levar Reyes, cardiologist    Chronic back pain     Dr. Jonathon Tristan, pain management specialist, at [facility].    CKD (chronic kidney disease)     Follows with Dr. Mccullough, nephrologist    GERD (gastroesophageal reflux disease)     High cholesterol     History of abnormal cervical Pap smear     HTN (hypertension)     Postmenopausal     Tobacco use        Past Surgical History:   Procedure Laterality Date    BACK SURGERY      DILATION AND CURETTAGE OF UTERUS      partial hysterectomy      Due to Fibroids       Review of patient's allergies indicates:  No Known Allergies    No current facility-administered medications on file prior to encounter.      Current Outpatient Prescriptions on File Prior to Encounter   Medication Sig    benzonatate (TESSALON) 100 MG capsule take 1 capsule by mouth twice a day if needed for cough    butalbital-acetaminophen-caffeine -40 mg (FIORICET, ESGIC) -40 mg per tablet Take 1 tablet by mouth daily as needed for Pain.    CRESTOR 20 mg tablet Take 1 tablet (20 mg total) by mouth every evening.    diltiaZEM (TIAZAC) 360 MG CpSR Take 1 capsule (360 mg total) by mouth once  daily.    esomeprazole (NEXIUM) 40 MG capsule Take 1 capsule by mouth once daily.    ferrous sulfate 325 mg (65 mg iron) Tab tablet Take 1 tablet by mouth 2 (two) times daily.    furosemide (LASIX) 40 MG tablet Take 1 tablet (40 mg total) by mouth 2 (two) times daily.    gabapentin (NEURONTIN) 300 MG capsule Take 300 mg by mouth 2 (two) times daily. 2 tablets in the AM and 3 tablets at night    hydrALAZINE (APRESOLINE) 100 MG tablet Take 1 tablet (100 mg total) by mouth 3 (three) times daily.    hyoscyamine (LEVSIN/SL) 0.125 mg Subl Place 1 tablet (0.125 mg total) under the tongue every 4 (four) hours as needed.    losartan (COZAAR) 25 MG tablet Take 1 tablet (25 mg total) by mouth once daily.    methocarbamol (ROBAXIN) 750 MG Tab Take 1 tablet by mouth 3 (three) times daily as needed.    metoprolol tartrate (LOPRESSOR) 50 MG tablet Take 1 tablet (50 mg total) by mouth 2 (two) times daily.    oxycodone 20 mg Tab 20 mg every 6 (six) hours as needed.     promethazine (PHENERGAN) 12.5 MG Tab Take 12.5 mg by mouth every 8 (eight) hours.    sertraline (ZOLOFT) 50 MG tablet Take 1 tablet (50 mg total) by mouth once daily.    terazosin (HYTRIN) 2 MG capsule take 2 capsule by mouth at bedtime     Family History     Problem Relation (Age of Onset)    Diabetes Cousin    Heart disease Father, Sister, Brother    Hypertension Brother    Thrombosis Mother        Social History Main Topics    Smoking status: Current Every Day Smoker     Packs/day: 1.00     Types: Cigarettes     Start date: 1/1/1985    Smokeless tobacco: Never Used      Comment: Interested in smoking cessation program.    Alcohol use No    Drug use: No    Sexual activity: No     Review of Systems   Constitutional: Negative.    HENT: Negative.    Eyes: Negative.    Respiratory: Negative.    Cardiovascular: Negative.    Gastrointestinal: Positive for abdominal pain, diarrhea, nausea and vomiting. Negative for abdominal distention, anal bleeding,  blood in stool, constipation and rectal pain.   Endocrine: Negative.    Genitourinary: Negative.    Musculoskeletal: Negative.    Skin: Negative.    Allergic/Immunologic: Negative.    Neurological: Negative.    Hematological: Negative.    Psychiatric/Behavioral: Negative.      Objective:     Vital Signs (Most Recent):  Temp: 98.7 °F (37.1 °C) (06/14/17 1645)  Pulse: 72 (06/14/17 1645)  Resp: 18 (06/14/17 1645)  BP: 130/79 (06/14/17 1645)  SpO2: 97 % (06/14/17 1645) Vital Signs (24h Range):  Temp:  [98 °F (36.7 °C)-98.7 °F (37.1 °C)] 98.7 °F (37.1 °C)  Pulse:  [56-83] 72  Resp:  [16-20] 18  SpO2:  [92 %-100 %] 97 %  BP: (123-157)/(71-90) 130/79     Weight: 111.1 kg (245 lb)  Body mass index is 40.77 kg/m².    Physical Exam   Constitutional: She is oriented to person, place, and time. She appears well-developed and well-nourished.   HENT:   Head: Normocephalic and atraumatic.   Right Ear: External ear normal.   Left Ear: External ear normal.   Nose: Nose normal.   Mouth/Throat: Oropharynx is clear and moist.   Eyes: Conjunctivae and EOM are normal. Pupils are equal, round, and reactive to light. Right eye exhibits no discharge. Left eye exhibits discharge. No scleral icterus.   Neck: Normal range of motion. Neck supple. No JVD present. No tracheal deviation present. No thyromegaly present.   Cardiovascular: Normal rate, regular rhythm, normal heart sounds and intact distal pulses.  Exam reveals no gallop and no friction rub.    No murmur heard.  Pulmonary/Chest: No stridor. No respiratory distress. She has no wheezes. She has no rales. She exhibits no tenderness.   Abdominal: Soft. Bowel sounds are normal. She exhibits no distension and no mass. There is no tenderness. There is no rebound and no guarding. No hernia.   Musculoskeletal: She exhibits no edema, tenderness or deformity.   Lymphadenopathy:     She has no cervical adenopathy.   Neurological: She is alert and oriented to person, place, and time. She displays  normal reflexes. No cranial nerve deficit. She exhibits normal muscle tone. Coordination normal.   Skin: Skin is warm and dry. Capillary refill takes less than 2 seconds.   Psychiatric: She has a normal mood and affect. Her behavior is normal. Judgment and thought content normal.        Significant Labs:   A1C: No results for input(s): HGBA1C in the last 4320 hours.  Blood Culture: No results for input(s): LABBLOO in the last 48 hours.  CBC:     Recent Labs  Lab 06/13/17  1631 06/14/17  0450   WBC 8.84 7.39   HGB 9.3* 9.7*   HCT 29.2* 30.3*    180     CMP:     Recent Labs  Lab 06/13/17  1631 06/14/17  0450    140   K 3.7 4.7   * 113*   CO2 17* 17*   GLU 94 87   BUN 54* 51*   CREATININE 3.2* 3.0*   CALCIUM 10.5 10.7*   PROT 7.5 7.9   ALBUMIN 3.1* 3.1*   BILITOT 0.5 0.5   ALKPHOS 117 116   AST 21 31   ALT 16 25   ANIONGAP 10 10   EGFRNONAA 15* 16*     Cardiac Markers: No results for input(s): CKMB, MYOGLOBIN, BNP, TROPISTAT in the last 48 hours.  Lactic Acid: No results for input(s): LACTATE in the last 48 hours.  Lipase:     Recent Labs  Lab 06/13/17  1631   LIPASE 48     TSH: No results for input(s): TSH in the last 4320 hours.    Significant Imaging:   Imaging Results          X-Ray Abdomen Flat And Erect (Final result)  Result time 06/14/17 11:38:04    Final result by Dalton Contreras III, MD (06/14/17 11:38:04)                 Impression:        1. Very slight improvement in the gas pattern although I still suspect an element of small bowel obstruction. Continued followup recommended.      Electronically signed by: DALTON CONTRERAS MD  Date:     06/14/17  Time:    11:38              Narrative:    Abdomen series, 2 views.    Indication: Small bowel obstruction.    Compared to June 13.    There is no free air. Small bowel is again moderately distended although this appears slightly improved/diminished compared to yesterday's study. There is also more colon gas on the current study. No  interval worsening or new abnormalities.. Postop changes again noted.                             CT Abdomen Pelvis  Without Contrast (Final result)  Result time 06/13/17 20:52:20   Procedure changed from CT Abdomen Pelvis With Contrast     Final result by Elias Biggs MD (06/13/17 20:52:20)                 Impression:     Findings concerning for developing or partial small bowel obstruction.  Sigmoid diverticulosis.  Small pericardial effusion.  Hiatal hernia.  3 mm nonobstructing left kidney stone.    All CT scans at this facility use dose modulation, iterative reconstruction, and/or weight based dosing when appropriate to reduce radiation dose to as low as reasonably achievable.      Electronically signed by: ELIAS BIGGS MD  Date:     06/13/17  Time:    20:52              Narrative:    Exam: CT scan of the abdomen and pelvis without contrast    History:     Generalzed abdominal pain    Findings: Small bulla or blebs in the lung bases.  Small pericardial effusion.  A hernia.  The liver and spleen are normal.    The gallbladder is normal. The pancreas is unremarkable.    No adrenal masses are identified.    No obstructing kidney stones hydronephrosis or renal masses are appreciated.  3 mm nonobstructing stone left kidney.  The Aorta is atherosclerotic.    Urinary bladder appears normal.    No multiple loops of mildly dilated small bowel in the midabdomen noted with collapsed distal small bowel suggesting partial developing obstruction.  Sigmoid diverticulosis.    No significant osseous findings.  Postop change lumbar spine.                             X-Ray Abdomen Flat And Erect (Final result)  Result time 06/13/17 16:31:31    Final result by Ricardo Dailey III, MD (06/13/17 16:31:31)                 Impression:     Small bowel dilation suggesting small bowel traction..      Electronically signed by: RICARDO DAILEY MD  Date:     06/13/17  Time:    16:31              Narrative:    XR ABDOMEN FLAT AND  ERECT    Indication: Abdominal Pain    Findings: There is moderate fairly diffuse small bowel dilation.  There is no significant colonic dilation.  No free air is identified.  Lumbar fusion changes are again noted.  Stable small pelvic phleboliths.                             X-Ray Knee Complete 4 Or More Views Bilat (Final result)  Result time 06/13/17 16:34:29    Final result by Ricardo Dailey III, MD (06/13/17 16:34:29)                 Impression:     Severe bilateral tricompartmental osteoarthritis.  Osteochondral lesion of the left medial femoral condyle.      Electronically signed by: RICARDO DAILEY MD  Date:     06/13/17  Time:    16:34              Narrative:    XR KNEE COMP 4 OR MORE VIEWS BILAT    Clinical History:  M25.561 Pain in right knee; M25.562 Pain in left knee    Findings: There is a 1.4 cm osteochondral defect of the left medial femoral condyle with approximately 4 mm of articular collapse.  No acute appearing fracture, dislocation or other acute injury is seen in either knee.  No significant joint effusion is identified.  There is severe tricompartmental osteoarthritis of both knees with joint space loss, subchondral degenerative cystic change and prominent marginal osteophytes most advanced in the medial femorotibial compartment.  There is a mild associated genu varus deformity.                            Objective:     Vital Signs (Most Recent):  Temp: 98.7 °F (37.1 °C) (06/14/17 1645)  Pulse: 72 (06/14/17 1645)  Resp: 18 (06/14/17 1645)  BP: 130/79 (06/14/17 1645)  SpO2: 97 % (06/14/17 1645) Vital Signs (24h Range):  Temp:  [98 °F (36.7 °C)-98.7 °F (37.1 °C)] 98.7 °F (37.1 °C)  Pulse:  [56-83] 72  Resp:  [16-20] 18  SpO2:  [92 %-100 %] 97 %  BP: (123-157)/(71-90) 130/79     Weight: 111.1 kg (245 lb)  Body mass index is 40.77 kg/m².    Physical Exam   Constitutional: She is oriented to person, place, and time. She appears well-developed and well-nourished.   HENT:   Head: Normocephalic and  atraumatic.   Right Ear: External ear normal.   Left Ear: External ear normal.   Nose: Nose normal.   Mouth/Throat: Oropharynx is clear and moist.   Eyes: Conjunctivae and EOM are normal. Pupils are equal, round, and reactive to light. Right eye exhibits no discharge. Left eye exhibits discharge. No scleral icterus.   Neck: Normal range of motion. Neck supple. No JVD present. No tracheal deviation present. No thyromegaly present.   Cardiovascular: Normal rate, regular rhythm, normal heart sounds and intact distal pulses.  Exam reveals no gallop and no friction rub.    No murmur heard.  Pulmonary/Chest: No stridor. No respiratory distress. She has no wheezes. She has no rales. She exhibits no tenderness.   Abdominal: Soft. Bowel sounds are normal. She exhibits no distension and no mass. There is no tenderness. There is no rebound and no guarding. No hernia.   Musculoskeletal: She exhibits no edema, tenderness or deformity.   Lymphadenopathy:     She has no cervical adenopathy.   Neurological: She is alert and oriented to person, place, and time. She displays normal reflexes. No cranial nerve deficit. She exhibits normal muscle tone. Coordination normal.   Skin: Skin is warm and dry. Capillary refill takes less than 2 seconds.   Psychiatric: She has a normal mood and affect. Her behavior is normal. Judgment and thought content normal.        Significant Labs:   A1C: No results for input(s): HGBA1C in the last 4320 hours.  Blood Culture: No results for input(s): LABBLOO in the last 48 hours.  CBC:     Recent Labs  Lab 06/13/17  1631 06/14/17  0450   WBC 8.84 7.39   HGB 9.3* 9.7*   HCT 29.2* 30.3*    180     CMP:     Recent Labs  Lab 06/13/17  1631 06/14/17  0450    140   K 3.7 4.7   * 113*   CO2 17* 17*   GLU 94 87   BUN 54* 51*   CREATININE 3.2* 3.0*   CALCIUM 10.5 10.7*   PROT 7.5 7.9   ALBUMIN 3.1* 3.1*   BILITOT 0.5 0.5   ALKPHOS 117 116   AST 21 31   ALT 16 25   ANIONGAP 10 10   EGFRNONAA 15*  16*     Cardiac Markers: No results for input(s): CKMB, MYOGLOBIN, BNP, TROPISTAT in the last 48 hours.  Lactic Acid: No results for input(s): LACTATE in the last 48 hours.  Lipase:     Recent Labs  Lab 06/13/17  1631   LIPASE 48     TSH: No results for input(s): TSH in the last 4320 hours.    Significant Imaging:   Imaging Results          X-Ray Abdomen Flat And Erect (Final result)  Result time 06/14/17 11:38:04    Final result by Dalton Contreras III, MD (06/14/17 11:38:04)                 Impression:        1. Very slight improvement in the gas pattern although I still suspect an element of small bowel obstruction. Continued followup recommended.      Electronically signed by: DALTON CONTRERAS MD  Date:     06/14/17  Time:    11:38              Narrative:    Abdomen series, 2 views.    Indication: Small bowel obstruction.    Compared to June 13.    There is no free air. Small bowel is again moderately distended although this appears slightly improved/diminished compared to yesterday's study. There is also more colon gas on the current study. No interval worsening or new abnormalities.. Postop changes again noted.                             CT Abdomen Pelvis  Without Contrast (Final result)  Result time 06/13/17 20:52:20   Procedure changed from CT Abdomen Pelvis With Contrast     Final result by Elias Biggs MD (06/13/17 20:52:20)                 Impression:     Findings concerning for developing or partial small bowel obstruction.  Sigmoid diverticulosis.  Small pericardial effusion.  Hiatal hernia.  3 mm nonobstructing left kidney stone.    All CT scans at this facility use dose modulation, iterative reconstruction, and/or weight based dosing when appropriate to reduce radiation dose to as low as reasonably achievable.      Electronically signed by: ELIAS BIGGS MD  Date:     06/13/17  Time:    20:52              Narrative:    Exam: CT scan of the abdomen and pelvis without contrast    History:      Generalzed abdominal pain    Findings: Small bulla or blebs in the lung bases.  Small pericardial effusion.  A hernia.  The liver and spleen are normal.    The gallbladder is normal. The pancreas is unremarkable.    No adrenal masses are identified.    No obstructing kidney stones hydronephrosis or renal masses are appreciated.  3 mm nonobstructing stone left kidney.  The Aorta is atherosclerotic.    Urinary bladder appears normal.    No multiple loops of mildly dilated small bowel in the midabdomen noted with collapsed distal small bowel suggesting partial developing obstruction.  Sigmoid diverticulosis.    No significant osseous findings.  Postop change lumbar spine.                             X-Ray Abdomen Flat And Erect (Final result)  Result time 06/13/17 16:31:31    Final result by Ricardo Dailey III, MD (06/13/17 16:31:31)                 Impression:     Small bowel dilation suggesting small bowel traction..      Electronically signed by: RICARDO DAILEY MD  Date:     06/13/17  Time:    16:31              Narrative:    XR ABDOMEN FLAT AND ERECT    Indication: Abdominal Pain    Findings: There is moderate fairly diffuse small bowel dilation.  There is no significant colonic dilation.  No free air is identified.  Lumbar fusion changes are again noted.  Stable small pelvic phleboliths.                             X-Ray Knee Complete 4 Or More Views Bilat (Final result)  Result time 06/13/17 16:34:29    Final result by Ricardo Dailey III, MD (06/13/17 16:34:29)                 Impression:     Severe bilateral tricompartmental osteoarthritis.  Osteochondral lesion of the left medial femoral condyle.      Electronically signed by: RICARDO DAILEY MD  Date:     06/13/17  Time:    16:34              Narrative:    XR KNEE COMP 4 OR MORE VIEWS BILAT    Clinical History:  M25.561 Pain in right knee; M25.562 Pain in left knee    Findings: There is a 1.4 cm osteochondral defect of the left medial femoral condyle with  approximately 4 mm of articular collapse.  No acute appearing fracture, dislocation or other acute injury is seen in either knee.  No significant joint effusion is identified.  There is severe tricompartmental osteoarthritis of both knees with joint space loss, subchondral degenerative cystic change and prominent marginal osteophytes most advanced in the medial femorotibial compartment.  There is a mild associated genu varus deformity.                            Assessment/Plan:      * Partial small bowel obstruction    Rest bowels. IVF NS at 75cc/hour. Fluid I/O.  -NPO  -Repeat KUB 6/14/17 showed slight improvement        CKD (chronic kidney disease), stage IV    Will recheck BUN/Cr in am with hydration and expect improvement.           Morbid obesity with BMI of 40.0-44.9, adult    Monitor outpatient          Hyperlipidemia    Resume home meds when tolerating po          Essential hypertension    Resume home meds when tolerating PO  monitor            VTE Risk Mitigation         Ordered     enoxaparin injection 30 mg  Daily     Route:  Subcutaneous        06/14/17 1531     Medium Risk of VTE  Once      06/14/17 0742          Elizabeth Aguirre NP  Department of Hospital Medicine   [facility]

## 2017-06-14 NOTE — SUBJECTIVE & OBJECTIVE
Past Medical History:   Diagnosis Date    Anemia     CHF (congestive heart failure)     Follows with Dr. Levar Reyes, cardiologist    Chronic back pain     Dr. Jonathon Tristan, pain management specialist, at [facility].    CKD (chronic kidney disease)     Follows with Dr. Mccullough, nephrologist    GERD (gastroesophageal reflux disease)     High cholesterol     History of abnormal cervical Pap smear     HTN (hypertension)     Postmenopausal     Tobacco use        Past Surgical History:   Procedure Laterality Date    BACK SURGERY      DILATION AND CURETTAGE OF UTERUS      partial hysterectomy      Due to Fibroids       Review of patient's allergies indicates:  No Known Allergies    No current facility-administered medications on file prior to encounter.      Current Outpatient Prescriptions on File Prior to Encounter   Medication Sig    benzonatate (TESSALON) 100 MG capsule take 1 capsule by mouth twice a day if needed for cough    butalbital-acetaminophen-caffeine -40 mg (FIORICET, ESGIC) -40 mg per tablet Take 1 tablet by mouth daily as needed for Pain.    CRESTOR 20 mg tablet Take 1 tablet (20 mg total) by mouth every evening.    diltiaZEM (TIAZAC) 360 MG CpSR Take 1 capsule (360 mg total) by mouth once daily.    esomeprazole (NEXIUM) 40 MG capsule Take 1 capsule by mouth once daily.    ferrous sulfate 325 mg (65 mg iron) Tab tablet Take 1 tablet by mouth 2 (two) times daily.    furosemide (LASIX) 40 MG tablet Take 1 tablet (40 mg total) by mouth 2 (two) times daily.    gabapentin (NEURONTIN) 300 MG capsule Take 300 mg by mouth 2 (two) times daily. 2 tablets in the AM and 3 tablets at night    hydrALAZINE (APRESOLINE) 100 MG tablet Take 1 tablet (100 mg total) by mouth 3 (three) times daily.    hyoscyamine (LEVSIN/SL) 0.125 mg Subl Place 1 tablet (0.125 mg total) under the tongue every 4 (four) hours as needed.    losartan (COZAAR) 25 MG tablet Take 1 tablet (25 mg total)  by mouth once daily.    methocarbamol (ROBAXIN) 750 MG Tab Take 1 tablet by mouth 3 (three) times daily as needed.    metoprolol tartrate (LOPRESSOR) 50 MG tablet Take 1 tablet (50 mg total) by mouth 2 (two) times daily.    oxycodone 20 mg Tab 20 mg every 6 (six) hours as needed.     promethazine (PHENERGAN) 12.5 MG Tab Take 12.5 mg by mouth every 8 (eight) hours.    sertraline (ZOLOFT) 50 MG tablet Take 1 tablet (50 mg total) by mouth once daily.    terazosin (HYTRIN) 2 MG capsule take 2 capsule by mouth at bedtime     Family History     Problem Relation (Age of Onset)    Diabetes Cousin    Heart disease Father, Sister, Brother    Hypertension Brother    Thrombosis Mother        Social History Main Topics    Smoking status: Current Every Day Smoker     Packs/day: 1.00     Types: Cigarettes     Start date: 1/1/1985    Smokeless tobacco: Never Used      Comment: Interested in smoking cessation program.    Alcohol use No    Drug use: No    Sexual activity: No     Review of Systems   Constitutional: Negative.    HENT: Negative.    Eyes: Negative.    Respiratory: Negative.    Cardiovascular: Negative.    Gastrointestinal: Positive for abdominal pain, diarrhea and nausea. Negative for abdominal distention, anal bleeding, blood in stool, constipation, rectal pain and vomiting.   Endocrine: Negative.    Genitourinary: Negative.    Musculoskeletal: Negative.    Skin: Negative.    Allergic/Immunologic: Negative.    Neurological: Negative.    Hematological: Negative.    Psychiatric/Behavioral: Negative.      Past Medical History:   Diagnosis Date    Anemia     CHF (congestive heart failure)     Follows with Dr. Levar Reyes, cardiologist    Chronic back pain     Dr. Jonathon Tristan, pain management specialist, at [facility].    CKD (chronic kidney disease)     Follows with Dr. Mccullough, nephrologist    GERD (gastroesophageal reflux disease)     High cholesterol     History of abnormal cervical Pap smear      HTN (hypertension)     Postmenopausal     Tobacco use        Past Surgical History:   Procedure Laterality Date    BACK SURGERY      DILATION AND CURETTAGE OF UTERUS      partial hysterectomy      Due to Fibroids       Review of patient's allergies indicates:  No Known Allergies    No current facility-administered medications on file prior to encounter.      Current Outpatient Prescriptions on File Prior to Encounter   Medication Sig    benzonatate (TESSALON) 100 MG capsule take 1 capsule by mouth twice a day if needed for cough    butalbital-acetaminophen-caffeine -40 mg (FIORICET, ESGIC) -40 mg per tablet Take 1 tablet by mouth daily as needed for Pain.    CRESTOR 20 mg tablet Take 1 tablet (20 mg total) by mouth every evening.    diltiaZEM (TIAZAC) 360 MG CpSR Take 1 capsule (360 mg total) by mouth once daily.    esomeprazole (NEXIUM) 40 MG capsule Take 1 capsule by mouth once daily.    ferrous sulfate 325 mg (65 mg iron) Tab tablet Take 1 tablet by mouth 2 (two) times daily.    furosemide (LASIX) 40 MG tablet Take 1 tablet (40 mg total) by mouth 2 (two) times daily.    gabapentin (NEURONTIN) 300 MG capsule Take 300 mg by mouth 2 (two) times daily. 2 tablets in the AM and 3 tablets at night    hydrALAZINE (APRESOLINE) 100 MG tablet Take 1 tablet (100 mg total) by mouth 3 (three) times daily.    hyoscyamine (LEVSIN/SL) 0.125 mg Subl Place 1 tablet (0.125 mg total) under the tongue every 4 (four) hours as needed.    losartan (COZAAR) 25 MG tablet Take 1 tablet (25 mg total) by mouth once daily.    methocarbamol (ROBAXIN) 750 MG Tab Take 1 tablet by mouth 3 (three) times daily as needed.    metoprolol tartrate (LOPRESSOR) 50 MG tablet Take 1 tablet (50 mg total) by mouth 2 (two) times daily.    oxycodone 20 mg Tab 20 mg every 6 (six) hours as needed.     promethazine (PHENERGAN) 12.5 MG Tab Take 12.5 mg by mouth every 8 (eight) hours.    sertraline (ZOLOFT) 50 MG tablet Take 1  tablet (50 mg total) by mouth once daily.    terazosin (HYTRIN) 2 MG capsule take 2 capsule by mouth at bedtime     Family History     Problem Relation (Age of Onset)    Diabetes Cousin    Heart disease Father, Sister, Brother    Hypertension Brother    Thrombosis Mother        Social History Main Topics    Smoking status: Current Every Day Smoker     Packs/day: 1.00     Types: Cigarettes     Start date: 1/1/1985    Smokeless tobacco: Never Used      Comment: Interested in smoking cessation program.    Alcohol use No    Drug use: No    Sexual activity: No     Review of Systems   Constitutional: Negative.    HENT: Negative.    Eyes: Negative.    Respiratory: Negative.    Cardiovascular: Negative.    Gastrointestinal: Positive for abdominal pain, diarrhea, nausea and vomiting. Negative for abdominal distention, anal bleeding, blood in stool, constipation and rectal pain.   Endocrine: Negative.    Genitourinary: Negative.    Musculoskeletal: Negative.    Skin: Negative.    Allergic/Immunologic: Negative.    Neurological: Negative.    Hematological: Negative.    Psychiatric/Behavioral: Negative.      Objective:     Vital Signs (Most Recent):  Temp: 98.7 °F (37.1 °C) (06/14/17 1645)  Pulse: 72 (06/14/17 1645)  Resp: 18 (06/14/17 1645)  BP: 130/79 (06/14/17 1645)  SpO2: 97 % (06/14/17 1645) Vital Signs (24h Range):  Temp:  [98 °F (36.7 °C)-98.7 °F (37.1 °C)] 98.7 °F (37.1 °C)  Pulse:  [56-83] 72  Resp:  [16-20] 18  SpO2:  [92 %-100 %] 97 %  BP: (123-157)/(71-90) 130/79     Weight: 111.1 kg (245 lb)  Body mass index is 40.77 kg/m².    Physical Exam   Constitutional: She is oriented to person, place, and time. She appears well-developed and well-nourished.   HENT:   Head: Normocephalic and atraumatic.   Right Ear: External ear normal.   Left Ear: External ear normal.   Nose: Nose normal.   Mouth/Throat: Oropharynx is clear and moist.   Eyes: Conjunctivae and EOM are normal. Pupils are equal, round, and reactive to  light. Right eye exhibits no discharge. Left eye exhibits discharge. No scleral icterus.   Neck: Normal range of motion. Neck supple. No JVD present. No tracheal deviation present. No thyromegaly present.   Cardiovascular: Normal rate, regular rhythm, normal heart sounds and intact distal pulses.  Exam reveals no gallop and no friction rub.    No murmur heard.  Pulmonary/Chest: No stridor. No respiratory distress. She has no wheezes. She has no rales. She exhibits no tenderness.   Abdominal: Soft. Bowel sounds are normal. She exhibits no distension and no mass. There is no tenderness. There is no rebound and no guarding. No hernia.   Musculoskeletal: She exhibits no edema, tenderness or deformity.   Lymphadenopathy:     She has no cervical adenopathy.   Neurological: She is alert and oriented to person, place, and time. She displays normal reflexes. No cranial nerve deficit. She exhibits normal muscle tone. Coordination normal.   Skin: Skin is warm and dry. Capillary refill takes less than 2 seconds.   Psychiatric: She has a normal mood and affect. Her behavior is normal. Judgment and thought content normal.        Significant Labs:   A1C: No results for input(s): HGBA1C in the last 4320 hours.  Blood Culture: No results for input(s): LABBLOO in the last 48 hours.  CBC:     Recent Labs  Lab 06/13/17  1631 06/14/17  0450   WBC 8.84 7.39   HGB 9.3* 9.7*   HCT 29.2* 30.3*    180     CMP:     Recent Labs  Lab 06/13/17  1631 06/14/17  0450    140   K 3.7 4.7   * 113*   CO2 17* 17*   GLU 94 87   BUN 54* 51*   CREATININE 3.2* 3.0*   CALCIUM 10.5 10.7*   PROT 7.5 7.9   ALBUMIN 3.1* 3.1*   BILITOT 0.5 0.5   ALKPHOS 117 116   AST 21 31   ALT 16 25   ANIONGAP 10 10   EGFRNONAA 15* 16*     Cardiac Markers: No results for input(s): CKMB, MYOGLOBIN, BNP, TROPISTAT in the last 48 hours.  Lactic Acid: No results for input(s): LACTATE in the last 48 hours.  Lipase:     Recent Labs  Lab 06/13/17  1631   LIPASE 48      TSH: No results for input(s): TSH in the last 4320 hours.    Significant Imaging:   Imaging Results          X-Ray Abdomen Flat And Erect (Final result)  Result time 06/14/17 11:38:04    Final result by Dalton Contreras III, MD (06/14/17 11:38:04)                 Impression:        1. Very slight improvement in the gas pattern although I still suspect an element of small bowel obstruction. Continued followup recommended.      Electronically signed by: DALTON CONTRERAS MD  Date:     06/14/17  Time:    11:38              Narrative:    Abdomen series, 2 views.    Indication: Small bowel obstruction.    Compared to June 13.    There is no free air. Small bowel is again moderately distended although this appears slightly improved/diminished compared to yesterday's study. There is also more colon gas on the current study. No interval worsening or new abnormalities.. Postop changes again noted.                             CT Abdomen Pelvis  Without Contrast (Final result)  Result time 06/13/17 20:52:20   Procedure changed from CT Abdomen Pelvis With Contrast     Final result by Elias Biggs MD (06/13/17 20:52:20)                 Impression:     Findings concerning for developing or partial small bowel obstruction.  Sigmoid diverticulosis.  Small pericardial effusion.  Hiatal hernia.  3 mm nonobstructing left kidney stone.    All CT scans at this facility use dose modulation, iterative reconstruction, and/or weight based dosing when appropriate to reduce radiation dose to as low as reasonably achievable.      Electronically signed by: ELIAS BIGGS MD  Date:     06/13/17  Time:    20:52              Narrative:    Exam: CT scan of the abdomen and pelvis without contrast    History:     Generalzed abdominal pain    Findings: Small bulla or blebs in the lung bases.  Small pericardial effusion.  A hernia.  The liver and spleen are normal.    The gallbladder is normal. The pancreas is unremarkable.    No adrenal masses  are identified.    No obstructing kidney stones hydronephrosis or renal masses are appreciated.  3 mm nonobstructing stone left kidney.  The Aorta is atherosclerotic.    Urinary bladder appears normal.    No multiple loops of mildly dilated small bowel in the midabdomen noted with collapsed distal small bowel suggesting partial developing obstruction.  Sigmoid diverticulosis.    No significant osseous findings.  Postop change lumbar spine.                             X-Ray Abdomen Flat And Erect (Final result)  Result time 06/13/17 16:31:31    Final result by Ricardo Dailey III, MD (06/13/17 16:31:31)                 Impression:     Small bowel dilation suggesting small bowel traction..      Electronically signed by: RICARDO DAILEY MD  Date:     06/13/17  Time:    16:31              Narrative:    XR ABDOMEN FLAT AND ERECT    Indication: Abdominal Pain    Findings: There is moderate fairly diffuse small bowel dilation.  There is no significant colonic dilation.  No free air is identified.  Lumbar fusion changes are again noted.  Stable small pelvic phleboliths.                             X-Ray Knee Complete 4 Or More Views Bilat (Final result)  Result time 06/13/17 16:34:29    Final result by Ricardo Dailey III, MD (06/13/17 16:34:29)                 Impression:     Severe bilateral tricompartmental osteoarthritis.  Osteochondral lesion of the left medial femoral condyle.      Electronically signed by: RICARDO DAILEY MD  Date:     06/13/17  Time:    16:34              Narrative:    XR KNEE COMP 4 OR MORE VIEWS BILAT    Clinical History:  M25.561 Pain in right knee; M25.562 Pain in left knee    Findings: There is a 1.4 cm osteochondral defect of the left medial femoral condyle with approximately 4 mm of articular collapse.  No acute appearing fracture, dislocation or other acute injury is seen in either knee.  No significant joint effusion is identified.  There is severe tricompartmental osteoarthritis of both knees  with joint space loss, subchondral degenerative cystic change and prominent marginal osteophytes most advanced in the medial femorotibial compartment.  There is a mild associated genu varus deformity.                            Objective:     Vital Signs (Most Recent):  Temp: 98.7 °F (37.1 °C) (06/14/17 1645)  Pulse: 72 (06/14/17 1645)  Resp: 18 (06/14/17 1645)  BP: 130/79 (06/14/17 1645)  SpO2: 97 % (06/14/17 1645) Vital Signs (24h Range):  Temp:  [98 °F (36.7 °C)-98.7 °F (37.1 °C)] 98.7 °F (37.1 °C)  Pulse:  [56-83] 72  Resp:  [16-20] 18  SpO2:  [92 %-100 %] 97 %  BP: (123-157)/(71-90) 130/79     Weight: 111.1 kg (245 lb)  Body mass index is 40.77 kg/m².    Physical Exam   Constitutional: She is oriented to person, place, and time. She appears well-developed and well-nourished.   HENT:   Head: Normocephalic and atraumatic.   Right Ear: External ear normal.   Left Ear: External ear normal.   Nose: Nose normal.   Mouth/Throat: Oropharynx is clear and moist.   Eyes: Conjunctivae and EOM are normal. Pupils are equal, round, and reactive to light. Right eye exhibits no discharge. Left eye exhibits discharge. No scleral icterus.   Neck: Normal range of motion. Neck supple. No JVD present. No tracheal deviation present. No thyromegaly present.   Cardiovascular: Normal rate, regular rhythm, normal heart sounds and intact distal pulses.  Exam reveals no gallop and no friction rub.    No murmur heard.  Pulmonary/Chest: No stridor. No respiratory distress. She has no wheezes. She has no rales. She exhibits no tenderness.   Abdominal: Soft. Bowel sounds are normal. She exhibits no distension and no mass. There is no tenderness. There is no rebound and no guarding. No hernia.   Musculoskeletal: She exhibits no edema, tenderness or deformity.   Lymphadenopathy:     She has no cervical adenopathy.   Neurological: She is alert and oriented to person, place, and time. She displays normal reflexes. No cranial nerve deficit. She  exhibits normal muscle tone. Coordination normal.   Skin: Skin is warm and dry. Capillary refill takes less than 2 seconds.   Psychiatric: She has a normal mood and affect. Her behavior is normal. Judgment and thought content normal.        Significant Labs:   A1C: No results for input(s): HGBA1C in the last 4320 hours.  Blood Culture: No results for input(s): LABBLOO in the last 48 hours.  CBC:     Recent Labs  Lab 06/13/17  1631 06/14/17  0450   WBC 8.84 7.39   HGB 9.3* 9.7*   HCT 29.2* 30.3*    180     CMP:     Recent Labs  Lab 06/13/17  1631 06/14/17  0450    140   K 3.7 4.7   * 113*   CO2 17* 17*   GLU 94 87   BUN 54* 51*   CREATININE 3.2* 3.0*   CALCIUM 10.5 10.7*   PROT 7.5 7.9   ALBUMIN 3.1* 3.1*   BILITOT 0.5 0.5   ALKPHOS 117 116   AST 21 31   ALT 16 25   ANIONGAP 10 10   EGFRNONAA 15* 16*     Cardiac Markers: No results for input(s): CKMB, MYOGLOBIN, BNP, TROPISTAT in the last 48 hours.  Lactic Acid: No results for input(s): LACTATE in the last 48 hours.  Lipase:     Recent Labs  Lab 06/13/17  1631   LIPASE 48     TSH: No results for input(s): TSH in the last 4320 hours.    Significant Imaging:   Imaging Results          X-Ray Abdomen Flat And Erect (Final result)  Result time 06/14/17 11:38:04    Final result by Dalton Contreras III, MD (06/14/17 11:38:04)                 Impression:        1. Very slight improvement in the gas pattern although I still suspect an element of small bowel obstruction. Continued followup recommended.      Electronically signed by: DALTON CONTRERAS MD  Date:     06/14/17  Time:    11:38              Narrative:    Abdomen series, 2 views.    Indication: Small bowel obstruction.    Compared to June 13.    There is no free air. Small bowel is again moderately distended although this appears slightly improved/diminished compared to yesterday's study. There is also more colon gas on the current study. No interval worsening or new abnormalities.. Postop  changes again noted.                             CT Abdomen Pelvis  Without Contrast (Final result)  Result time 06/13/17 20:52:20   Procedure changed from CT Abdomen Pelvis With Contrast     Final result by Elias Biggs MD (06/13/17 20:52:20)                 Impression:     Findings concerning for developing or partial small bowel obstruction.  Sigmoid diverticulosis.  Small pericardial effusion.  Hiatal hernia.  3 mm nonobstructing left kidney stone.    All CT scans at this facility use dose modulation, iterative reconstruction, and/or weight based dosing when appropriate to reduce radiation dose to as low as reasonably achievable.      Electronically signed by: ELIAS BIGGS MD  Date:     06/13/17  Time:    20:52              Narrative:    Exam: CT scan of the abdomen and pelvis without contrast    History:     Generalzed abdominal pain    Findings: Small bulla or blebs in the lung bases.  Small pericardial effusion.  A hernia.  The liver and spleen are normal.    The gallbladder is normal. The pancreas is unremarkable.    No adrenal masses are identified.    No obstructing kidney stones hydronephrosis or renal masses are appreciated.  3 mm nonobstructing stone left kidney.  The Aorta is atherosclerotic.    Urinary bladder appears normal.    No multiple loops of mildly dilated small bowel in the midabdomen noted with collapsed distal small bowel suggesting partial developing obstruction.  Sigmoid diverticulosis.    No significant osseous findings.  Postop change lumbar spine.                             X-Ray Abdomen Flat And Erect (Final result)  Result time 06/13/17 16:31:31    Final result by Ricardo Dailey III, MD (06/13/17 16:31:31)                 Impression:     Small bowel dilation suggesting small bowel traction..      Electronically signed by: RICARDO DAILEY MD  Date:     06/13/17  Time:    16:31              Narrative:    XR ABDOMEN FLAT AND ERECT    Indication: Abdominal Pain    Findings: There is  moderate fairly diffuse small bowel dilation.  There is no significant colonic dilation.  No free air is identified.  Lumbar fusion changes are again noted.  Stable small pelvic phleboliths.                             X-Ray Knee Complete 4 Or More Views Bilat (Final result)  Result time 06/13/17 16:34:29    Final result by Ricardo Dailey III, MD (06/13/17 16:34:29)                 Impression:     Severe bilateral tricompartmental osteoarthritis.  Osteochondral lesion of the left medial femoral condyle.      Electronically signed by: RICARDO DAILEY MD  Date:     06/13/17  Time:    16:34              Narrative:    XR KNEE COMP 4 OR MORE VIEWS BILAT    Clinical History:  M25.561 Pain in right knee; M25.562 Pain in left knee    Findings: There is a 1.4 cm osteochondral defect of the left medial femoral condyle with approximately 4 mm of articular collapse.  No acute appearing fracture, dislocation or other acute injury is seen in either knee.  No significant joint effusion is identified.  There is severe tricompartmental osteoarthritis of both knees with joint space loss, subchondral degenerative cystic change and prominent marginal osteophytes most advanced in the medial femorotibial compartment.  There is a mild associated genu varus deformity.

## 2017-06-14 NOTE — HOSPITAL COURSE
No further vomiting. Repeat KUB showed slight improvement in gas pattern. Positive bowel sounds. Overnight improved. She tolerated clear, full liquids, and solid foods. Vanesa MALONEY arranged for SN/OT/PT. Smoking cessation counseling done. Bilateral knee Xray showed severe osteoarthritis - refuses knee surgery. Creatinine improved to 2.6 with fluids. Patient seen and examined and deemed stable for d/c.

## 2017-06-14 NOTE — HPI
The patient is a 61-year old female presenting with diarrhea, nausea, vomiting and abdominal pain to the Er. She states she started feeling sick about 2 days ago and experienced a combination of vomiting, nausea, abdominal pain. Her home medications however she she has been on hyoscyamine since at least 6/7/2017. She lives at home, is on home oxygen, sill smokes half a pack a day and was recently admitted for decompensated CHF and found to have an EF of 60-65% about 32 months ago. CT during this admission showed dilated bowel loops and her creatinine was elevated to 3 form her baseline of around 2.

## 2017-06-14 NOTE — PHYSICIAN QUERY
"PT Name: Kelsey Booker  MR #: [id number]    Physician Query Form - Heart  Condition Clarification     CDS/: Elsie Gonzalez RN               Contact information:[email or web address]  This form is a permanent document in the medical record.     Query Date: June 14, 2017    By submitting this query, we are merely seeking further clarification of documentation. Please utilize your independent clinical judgment when addressing the question(s) below.    The medical record contains the following   Indicators     Supporting Clinical Findings Location in Medical Record    BNP      EF     x Radiology findings Findings concerning for developing or partial small bowel obstruction. Sigmoid diverticulosis. Small pericardial effusion. Hiatal hernia. 3 mm nonobstructing left kidney stone.  CT of abdomen/pelvis 6/13    Echo Results      "Ascites" documented      "SOB" or "STOREY" documented      "Hypoxia" documented     x Heart Failure documented She lives at home, is on home oxygen, sill smokes half a  pack a day and was recently admitted for decompensated CHF and found to have an EF of 60-65% about 32 months ago. H&P    "Edema" documented     x Diuretics/Meds furosemide tablet 40 mg  metoprolol tartrate (LOPRESSOR) tablet 50 mg MAR 6/14-present    Treatment:      Other:          Provider, please specify diagnosis or diagnoses associated with above clinical findings.    [ x ] Chronic Diastolic Heart Failure (EF > 40)*  [  ] Other Type of Heart Failure (please specify type): _________________________  [  ] Heart Failure Ruled Out  [  ] Other (please specify): ___________________________________  [  ] Clinically Undetermined            *[employer]                                                                                                          Please document in your progress notes daily for the duration of treatment until resolved and include in your discharge summary.    "

## 2017-06-14 NOTE — ED NOTES
Patient c/o abdominal pain at this time, hyoscyamine requested from pharmacy. Patient offered Robaxin that was refused at 0600 dose, patient refused. Patient refusing xray due to pain at this time.

## 2017-06-15 VITALS
BODY MASS INDEX: 40.82 KG/M2 | HEIGHT: 65 IN | SYSTOLIC BLOOD PRESSURE: 138 MMHG | HEART RATE: 74 BPM | DIASTOLIC BLOOD PRESSURE: 81 MMHG | WEIGHT: 245 LBS | OXYGEN SATURATION: 96 % | RESPIRATION RATE: 18 BRPM | TEMPERATURE: 99 F

## 2017-06-15 DIAGNOSIS — M17.0 PRIMARY OSTEOARTHRITIS OF BOTH KNEES: ICD-10-CM

## 2017-06-15 DIAGNOSIS — R53.1 WEAKNESS: Primary | ICD-10-CM

## 2017-06-15 LAB
ALBUMIN SERPL BCP-MCNC: 2.7 G/DL
ALP SERPL-CCNC: 96 U/L
ALT SERPL W/O P-5'-P-CCNC: 22 U/L
ANION GAP SERPL CALC-SCNC: 7 MMOL/L
AST SERPL-CCNC: 25 U/L
BASOPHILS # BLD AUTO: 0.01 K/UL
BASOPHILS NFR BLD: 0.2 %
BILIRUB SERPL-MCNC: 0.5 MG/DL
BUN SERPL-MCNC: 44 MG/DL
CALCIUM SERPL-MCNC: 10 MG/DL
CHLORIDE SERPL-SCNC: 114 MMOL/L
CO2 SERPL-SCNC: 20 MMOL/L
CREAT SERPL-MCNC: 2.6 MG/DL
DIFFERENTIAL METHOD: ABNORMAL
EOSINOPHIL # BLD AUTO: 0 K/UL
EOSINOPHIL NFR BLD: 0.6 %
ERYTHROCYTE [DISTWIDTH] IN BLOOD BY AUTOMATED COUNT: 17.6 %
EST. GFR  (AFRICAN AMERICAN): 22 ML/MIN/1.73 M^2
EST. GFR  (NON AFRICAN AMERICAN): 19 ML/MIN/1.73 M^2
GLUCOSE SERPL-MCNC: 68 MG/DL
HCT VFR BLD AUTO: 26.9 %
HGB BLD-MCNC: 8.5 G/DL
LYMPHOCYTES # BLD AUTO: 0.8 K/UL
LYMPHOCYTES NFR BLD: 14.5 %
MCH RBC QN AUTO: 27.4 PG
MCHC RBC AUTO-ENTMCNC: 31.6 %
MCV RBC AUTO: 87 FL
MONOCYTES # BLD AUTO: 0.7 K/UL
MONOCYTES NFR BLD: 14.1 %
NEUTROPHILS # BLD AUTO: 3.7 K/UL
NEUTROPHILS NFR BLD: 70.6 %
PLATELET # BLD AUTO: 181 K/UL
PMV BLD AUTO: 9.4 FL
POTASSIUM SERPL-SCNC: 3.9 MMOL/L
PROT SERPL-MCNC: 6.6 G/DL
RBC # BLD AUTO: 3.1 M/UL
SODIUM SERPL-SCNC: 141 MMOL/L
WBC # BLD AUTO: 5.17 K/UL

## 2017-06-15 PROCEDURE — 99900038 HC OT GENERIC THERAPY SCREENING (STAT)

## 2017-06-15 PROCEDURE — 97116 GAIT TRAINING THERAPY: CPT

## 2017-06-15 PROCEDURE — 36415 COLL VENOUS BLD VENIPUNCTURE: CPT

## 2017-06-15 PROCEDURE — G8979 MOBILITY GOAL STATUS: HCPCS | Mod: CJ

## 2017-06-15 PROCEDURE — 97530 THERAPEUTIC ACTIVITIES: CPT

## 2017-06-15 PROCEDURE — 80053 COMPREHEN METABOLIC PANEL: CPT

## 2017-06-15 PROCEDURE — 25000003 PHARM REV CODE 250: Performed by: INTERNAL MEDICINE

## 2017-06-15 PROCEDURE — 97162 PT EVAL MOD COMPLEX 30 MIN: CPT

## 2017-06-15 PROCEDURE — G8978 MOBILITY CURRENT STATUS: HCPCS | Mod: CK

## 2017-06-15 PROCEDURE — 85025 COMPLETE CBC W/AUTO DIFF WBC: CPT

## 2017-06-15 RX ADMIN — METOPROLOL TARTRATE 50 MG: 50 TABLET ORAL at 08:06

## 2017-06-15 RX ADMIN — DILTIAZEM HYDROCHLORIDE 360 MG: 180 CAPSULE, COATED, EXTENDED RELEASE ORAL at 08:06

## 2017-06-15 RX ADMIN — FUROSEMIDE 40 MG: 40 TABLET ORAL at 08:06

## 2017-06-15 RX ADMIN — LOSARTAN POTASSIUM 25 MG: 25 TABLET, FILM COATED ORAL at 08:06

## 2017-06-15 RX ADMIN — METHOCARBAMOL 750 MG: 750 TABLET ORAL at 05:06

## 2017-06-15 RX ADMIN — FAMOTIDINE 20 MG: 20 INJECTION, SOLUTION INTRAVENOUS at 08:06

## 2017-06-15 RX ADMIN — HYDRALAZINE HYDROCHLORIDE 100 MG: 50 TABLET, FILM COATED ORAL at 05:06

## 2017-06-15 NOTE — CONSULTS
Met with patient. Patient expressed preference for [facility]. Signed patient preference placed in the blue folder. Referral made through [facility].

## 2017-06-15 NOTE — DISCHARGE SUMMARY
[facility] Hospital Medicine  Discharge Summary      Patient Name: Kelsey Booker  MRN: [id number]  Admission Date: 6/13/2017  Hospital Length of Stay: 2 days  Discharge Date and Time:  06/15/2017 11:00am  Attending Physician: Dr. Dayton Bruce   Discharging Provider: Elizabeth Aguirre NP  Primary Care Provider: Silvana Stover MD      HPI:   The patient is a 61-year old female presenting with diarrhea, nausea, vomiting and abdominal pain to the Er. She states she started feeling sick about 2 days ago and experienced a combination of vomiting, nausea, abdominal pain. Her home medications however she she has been on hyoscyamine since at least 6/7/2017. She lives at home, is on home oxygen, sill smokes half a pack a day and was recently admitted for decompensated CHF and found to have an EF of 60-65% about 32 months ago. CT during this admission showed dilated bowel loops and her creatinine was elevated to 3 form her baseline of around 2.     Hospital Course:   No further vomiting. Repeat KUB showed slight improvement in gas pattern. Positive bowel sounds. Overnight improved. She tolerated clear, full liquids, and solid foods. Vanesa  arranged for SN/OT/PT. Smoking cessation counseling done. Bilateral knee Xray showed severe osteoarthritis - refuses knee surgery. Creatinine improved to 2.6 with fluids. Patient seen and examined and deemed stable for d/c.      * No surgery found *      Indwelling Lines/Drains at time of discharge:   Lines/Drains/Airways          No matching active lines, drains, or airways          Consults:   Consults         Status Ordering Provider     Inpatient consult to Hospitalist  Once     Provider:  (Not yet assigned)    Acknowledged KERRI GUSMAN     Inpatient consult to Social Work  Once     Provider:  (Not yet assigned)    Completed ELIZABETH AGUIRRE          Significant Diagnostic Studies: Labs:   CMP   Recent Labs  Lab 06/13/17  1631 06/14/17  1418  06/15/17  0459    140 141   K 3.7 4.7 3.9   * 113* 114*   CO2 17* 17* 20*   GLU 94 87 68*   BUN 54* 51* 44*   CREATININE 3.2* 3.0* 2.6*   CALCIUM 10.5 10.7* 10.0   PROT 7.5 7.9 6.6   ALBUMIN 3.1* 3.1* 2.7*   BILITOT 0.5 0.5 0.5   ALKPHOS 117 116 96   AST 21 31 25   ALT 16 25 22   ANIONGAP 10 10 7*   ESTGFRAFRICA 17* 19* 22*   EGFRNONAA 15* 16* 19*    and CBC   Recent Labs  Lab 06/13/17  1631 06/14/17  0450 06/15/17  0500   WBC 8.84 7.39 5.17   HGB 9.3* 9.7* 8.5*   HCT 29.2* 30.3* 26.9*    180 181       Pending Diagnostic Studies:     None        Final Active Diagnoses:    Diagnosis Date Noted POA    PRINCIPAL PROBLEM:  Partial small bowel obstruction [K56.69] 06/13/2017 Yes    CKD (chronic kidney disease), stage IV [N18.4] 12/10/2016 Yes    Morbid obesity with BMI of 40.0-44.9, adult [E66.01, Z68.41] 05/18/2016 Not Applicable    Hyperlipidemia [E78.5] 05/18/2016 Yes    Essential hypertension [I10] 05/18/2016 Yes      Problems Resolved During this Admission:    Diagnosis Date Noted Date Resolved POA      No new Assessment & Plan notes have been filed under this hospital service since the last note was generated.  Service: Hospital Medicine      Discharged Condition: stable    Disposition: Home-Health Care [facility] [place] [facility]    Follow Up:  Follow-up Information     Silvana Stover MD. Schedule an appointment as soon as possible for a visit in 3 days.    Specialty:  Family Medicine  Contact information:  [address] [place] [address] [phone]             [facility] Cessation [facility].    Why:  [phone]               Patient Instructions:     Referral to Home health   Referral Priority: Routine Referral Type: Home Health   Referral Reason: Specialty Services Required    Requested Specialty: Home Health Services    Number of Visits Requested: 1      Diet general   Order Specific Question Answer Comments   Total calories: 1800 Calorie    Na restriction, if any: 2gNa    Additional  restrictions: Cardiac (Low Na/Chol)      Activity as tolerated     Call MD for:  increased confusion or weakness     Call MD for:  persistent dizziness, light-headedness, or visual disturbances     Call MD for:  severe persistent headache     Call MD for:  worsening rash     Call MD for:  difficulty breathing or increased cough     Call MD for:  redness, tenderness, or signs of infection (pain, swelling, redness, odor or green/yellow discharge around incision site)     Call MD for:  severe uncontrolled pain     Call MD for:  persistent nausea and vomiting or diarrhea     Call MD for:  temperature >100.4       Medications:  Reconciled Home Medications:   Discharge Medication List as of 6/15/2017  1:10 PM      CONTINUE these medications which have NOT CHANGED    Details   benzonatate (TESSALON) 100 MG capsule take 1 capsule by mouth twice a day if needed for cough, Normal      butalbital-acetaminophen-caffeine -40 mg (FIORICET, ESGIC) -40 mg per tablet Take 1 tablet by mouth daily as needed for Pain., Starting Wed 6/7/2017, Until Fri 7/7/2017, Normal      CRESTOR 20 mg tablet Take 1 tablet (20 mg total) by mouth every evening., Starting 10/21/2016, Until Discontinued, Normal      diltiaZEM (TIAZAC) 360 MG CpSR Take 1 capsule (360 mg total) by mouth once daily., Starting Wed 6/7/2017, Until Thu 6/7/2018, Normal      esomeprazole (NEXIUM) 40 MG capsule Take 1 capsule by mouth once daily., Starting 3/10/2016, Until Discontinued, Historical Med      ferrous sulfate 325 mg (65 mg iron) Tab tablet Take 1 tablet by mouth 2 (two) times daily., Starting 3/10/2016, Until Discontinued, Historical Med      furosemide (LASIX) 40 MG tablet Take 1 tablet (40 mg total) by mouth 2 (two) times daily., Starting Wed 6/7/2017, Normal      gabapentin (NEURONTIN) 300 MG capsule Take 300 mg by mouth 2 (two) times daily. 2 tablets in the AM and 3 tablets at night, Until Discontinued, Historical Med      hydrALAZINE (APRESOLINE)  100 MG tablet Take 1 tablet (100 mg total) by mouth 3 (three) times daily., Starting Wed 6/7/2017, Normal      hyoscyamine (LEVSIN/SL) 0.125 mg Subl Place 1 tablet (0.125 mg total) under the tongue every 4 (four) hours as needed., Starting Wed 6/7/2017, Normal      losartan (COZAAR) 25 MG tablet Take 1 tablet (25 mg total) by mouth once daily., Starting Wed 6/7/2017, Normal      methocarbamol (ROBAXIN) 750 MG Tab Take 1 tablet by mouth 3 (three) times daily as needed., Starting 4/17/2014, Until Discontinued, Historical Med      metoprolol tartrate (LOPRESSOR) 50 MG tablet Take 1 tablet (50 mg total) by mouth 2 (two) times daily., Starting Wed 6/7/2017, Normal      oxycodone 20 mg Tab 20 mg every 6 (six) hours as needed. , Starting 5/17/2016, Until Discontinued, Historical Med      promethazine (PHENERGAN) 12.5 MG Tab Take 12.5 mg by mouth every 8 (eight) hours., Starting 3/2/2016, Until Discontinued, Historical Med      sertraline (ZOLOFT) 50 MG tablet Take 1 tablet (50 mg total) by mouth once daily., Starting 10/21/2016, Until Sat 10/21/17, Normal      terazosin (HYTRIN) 2 MG capsule take 2 capsule by mouth at bedtime, Normal           Time spent on the discharge of patient: 45 minutes    Elizabeth Aguirre NP  Department of Hospital Medicine  [facility]

## 2017-06-15 NOTE — PT/OT/SLP PROGRESS
Occupational Therapy      Kelsey Booker  MRN: [id number]    EVAL INITIATED VIA CHART REVIEW. DISCHARGED IN PROGRESS.    Sveta Hollis, OT   3225  6/15/2017

## 2017-06-15 NOTE — PLAN OF CARE
D/C assessment done . Met with patient and family. Patient given written info on Advance Directives,  Living Will , pamphlet d/c planning begins on admission. Per patient request , plans to return home .      06/15/17 1025   Discharge Assessment   Assessment Type Discharge Planning Assessment   Confirmed/corrected address and phone number on facesheet? Yes   Assessment information obtained from? Patient;Caregiver;Medical Record   Expected Length of Stay (days) (TBD)   Communicated expected length of stay with patient/caregiver no   Type of Healthcare Directive Received Advance Directive;Other (Comment)  (Written info given for Advance Driectives, Living Will and Pamphlet on d/c planning begins on admission)   If Healthcare Directive is received, is it scanned into Epic? no (comment)   Prior to hospitilization cognitive status: Alert/Oriented   Prior to hospitalization functional status: Assistive Equipment   Current cognitive status: Alert/Oriented   Current Functional Status: Needs Assistance   Arrived From home or self-care   Lives With alone   Able to Return to Prior Arrangements yes   Is patient able to care for self after discharge? Yes   How many people do you have in your home that can help with your care after discharge? 1   Patient's perception of discharge disposition home or selfcare   Readmission Within The Last 30 Days no previous admission in last 30 days   Patient currently being followed by outpatient case management? No   Patient currently receives home health services? No   Does the patient currently use HME? No   Patient currently receives private duty nursing? No   Patient currently receives any other outside agency services? No   Equipment Currently Used at Home walker, rolling   Do you have any problems affording any of your prescribed medications? No   Is the patient taking medications as prescribed? yes   Do you have any financial concerns preventing you from receiving the healthcare you  need? No   Does the patient have transportation to healthcare appointments? Yes   Transportation Available car;family or friend will provide   On Dialysis? No   Does the patient receive services at the Coumadin Clinic? No   Are there any open cases? No   Discharge Plan A Home   Discharge Plan B Home   Patient/Family In Agreement With Plan yes

## 2017-06-15 NOTE — PLAN OF CARE
Problem: Patient Care Overview  Goal: Plan of Care Review  Outcome: Outcome(s) achieved Date Met: 06/15/17  Injury free this hospital stay. Denies any pain or n/v. VSS. Adequate for dc.

## 2017-06-15 NOTE — PLAN OF CARE
Problem: Patient Care Overview  Goal: Plan of Care Review  Outcome: Ongoing (interventions implemented as appropriate)  Fall prevention precautions maintained, pt remained free of falls throughout shift, call bell and personal items within reach, no reports of pain or nausea throughout shift. 24 hour chart check completed. Will continue to monitor

## 2017-06-15 NOTE — PLAN OF CARE
06/15/17 1402   Final Note   Assessment Type Final Discharge Note   Discharge Disposition Home-Health   Referral to / orders for Home Health Complete? Yes   Right Care Referral Info   Post Acute Recommendation Home-care

## 2017-06-15 NOTE — NURSING
Report received from Kari. Pt stable. No c/o pain at this time. Call light in reach. Bed locked and low. Will continue to monitor.

## 2017-06-19 ENCOUNTER — PATIENT OUTREACH (OUTPATIENT)
Dept: ADMINISTRATIVE | Facility: CLINIC | Age: 62
End: 2017-06-19
Payer: COMMERCIAL

## 2017-06-19 ENCOUNTER — TELEPHONE (OUTPATIENT)
Dept: FAMILY MEDICINE | Facility: CLINIC | Age: 62
End: 2017-06-19

## 2017-06-19 NOTE — TELEPHONE ENCOUNTER
Pt was d/c from hospital on 6/15. Having stomach pain. Can you work her in this week so she can get something for stomach pain?

## 2017-06-19 NOTE — PROGRESS NOTES
Discharge Information     Discharge Date:  6/15/17          Primary Discharge Diagnosis:  Partial small bowel obstruction          Mervat Colorado RN attempted to contact patient. No answer. The following message was left for the patient to return the call:  Good morning,  I am a nurse calling on behalf of [facility] from the Care Coordination Center.  This is a Transitional Care Call for Kelsey Booker. When you have a moment please contact us at [phone] or [phone] Monday through Friday, between the hours of 8 am to 4 pm. We look forward to speaking with you. On behalf of [facility] have a nice day.    The patient has a scheduled EST PT appointment with Silvana Stover MD on 7/12/17 @ [id number]. Message sent to PCP staff to move this appointment up to be within 14 days of discharge date and to change it to a HOSP FU visit.

## 2017-06-19 NOTE — TELEPHONE ENCOUNTER
That's fine.  Need TCC appt. However, advise pt as abdominal pain was related to small bowel obstruction, she should continue Hyoscyamine prescribed for abdominal spasm/pain and Tylenol can be used.

## 2017-07-27 DIAGNOSIS — R51.9 HEADACHE, UNSPECIFIED HEADACHE TYPE: ICD-10-CM

## 2017-07-27 RX ORDER — BUTALBITAL, ACETAMINOPHEN AND CAFFEINE 50; 325; 40 MG/1; MG/1; MG/1
TABLET ORAL
Qty: 10 TABLET | Refills: 0 | Status: SHIPPED | OUTPATIENT
Start: 2017-07-27

## 2017-08-24 DIAGNOSIS — I10 ESSENTIAL HYPERTENSION: ICD-10-CM

## 2017-08-28 RX ORDER — DILTIAZEM HYDROCHLORIDE 360 MG/1
CAPSULE, EXTENDED RELEASE ORAL
Qty: 30 CAPSULE | Refills: 1 | Status: SHIPPED | OUTPATIENT
Start: 2017-08-28

## 2017-10-09 ENCOUNTER — TELEPHONE (OUTPATIENT)
Dept: SMOKING CESSATION | Facility: CLINIC | Age: 62
End: 2017-10-09

## 2017-10-10 ENCOUNTER — CLINICAL SUPPORT (OUTPATIENT)
Dept: SMOKING CESSATION | Facility: CLINIC | Age: 62
End: 2017-10-10
Payer: COMMERCIAL

## 2017-10-10 DIAGNOSIS — F17.200 NICOTINE DEPENDENCE: Primary | ICD-10-CM

## 2017-10-10 PROCEDURE — 99407 BEHAV CHNG SMOKING > 10 MIN: CPT | Mod: S$GLB,,,

## 2017-10-30 ENCOUNTER — TELEPHONE (OUTPATIENT)
Dept: SMOKING CESSATION | Facility: CLINIC | Age: 62
End: 2017-10-30

## 2017-11-06 ENCOUNTER — TELEPHONE (OUTPATIENT)
Dept: GASTROENTEROLOGY | Facility: CLINIC | Age: 62
End: 2017-11-06

## 2017-11-06 NOTE — TELEPHONE ENCOUNTER
Returned call to pt who stated that her bowel leakage has started up again. She will try Metamucil and if it does not improve she will call for an appt.

## 2017-11-06 NOTE — TELEPHONE ENCOUNTER
----- Message from Sandip Novoa sent at 11/6/2017  9:35 AM CST -----  Contact: pt  Pt is calling nurse staff regarding what was the name of the medication that pt takes for bowel leakage. Pt call back to be advised cell [phone]  thanks

## 2018-03-22 ENCOUNTER — PATIENT OUTREACH (OUTPATIENT)
Dept: ADMINISTRATIVE | Facility: HOSPITAL | Age: 63
End: 2018-03-22

## 2018-03-22 NOTE — PROGRESS NOTES
Spoke with Patient for  [facility] Measure, She States needs a ride to office visits. So she will call back to schedule appt.          Gayatri BARRETO LPN Care Coordinator  Care Coordination Department  [facility]  [phone]

## 2018-05-07 ENCOUNTER — PATIENT OUTREACH (OUTPATIENT)
Dept: ADMINISTRATIVE | Facility: HOSPITAL | Age: 63
End: 2018-05-07

## 2018-12-05 ENCOUNTER — PATIENT OUTREACH (OUTPATIENT)
Dept: ADMINISTRATIVE | Facility: HOSPITAL | Age: 63
End: 2018-12-05